# Patient Record
Sex: MALE | Race: WHITE | Employment: FULL TIME | URBAN - METROPOLITAN AREA
[De-identification: names, ages, dates, MRNs, and addresses within clinical notes are randomized per-mention and may not be internally consistent; named-entity substitution may affect disease eponyms.]

---

## 2017-01-03 DIAGNOSIS — I10 ESSENTIAL HYPERTENSION: Primary | ICD-10-CM

## 2017-01-03 RX ORDER — LISINOPRIL 20 MG/1
20 TABLET ORAL 2 TIMES DAILY
Qty: 180 TABLET | Refills: 3 | Status: SHIPPED | OUTPATIENT
Start: 2017-01-03 | End: 2017-11-13

## 2017-07-24 DIAGNOSIS — I48.91 ATRIAL FIBRILLATION (H): ICD-10-CM

## 2017-08-25 DIAGNOSIS — I10 BENIGN ESSENTIAL HYPERTENSION: ICD-10-CM

## 2017-08-25 RX ORDER — METOPROLOL SUCCINATE 25 MG/1
25 TABLET, EXTENDED RELEASE ORAL DAILY
Qty: 90 TABLET | Refills: 0 | Status: SHIPPED | OUTPATIENT
Start: 2017-08-25 | End: 2017-11-13

## 2017-09-01 ENCOUNTER — OFFICE VISIT (OUTPATIENT)
Dept: INTERNAL MEDICINE | Facility: CLINIC | Age: 67
End: 2017-09-01
Payer: COMMERCIAL

## 2017-09-01 VITALS
HEART RATE: 93 BPM | TEMPERATURE: 98.3 F | BODY MASS INDEX: 39.53 KG/M2 | SYSTOLIC BLOOD PRESSURE: 160 MMHG | OXYGEN SATURATION: 98 % | DIASTOLIC BLOOD PRESSURE: 82 MMHG | WEIGHT: 267.7 LBS

## 2017-09-01 DIAGNOSIS — H61.23 BILATERAL IMPACTED CERUMEN: ICD-10-CM

## 2017-09-01 DIAGNOSIS — H91.93 BILATERAL HEARING LOSS, UNSPECIFIED HEARING LOSS TYPE: ICD-10-CM

## 2017-09-01 DIAGNOSIS — H93.13 TINNITUS, BILATERAL: Primary | ICD-10-CM

## 2017-09-01 PROCEDURE — 99213 OFFICE O/P EST LOW 20 MIN: CPT | Performed by: FAMILY MEDICINE

## 2017-09-01 NOTE — MR AVS SNAPSHOT
After Visit Summary   9/1/2017    Tevin Hollis    MRN: [id number]           Patient Information     Date Of Birth          1950        Visit Information        Provider Department      9/1/2017 10:00 AM Lorenzo Almeida MD [facility]        Today's Diagnoses     Tinnitus, bilateral    -  1    Bilateral hearing loss, unspecified hearing loss type        Bilateral impacted cerumen           Follow-ups after your visit        Your next 10 appointments already scheduled     Nov 13, 2017  7:30 AM CST   LAB with RU LAB   [facility] ([facility])    [address] [place] [address] [address]  [facility] [id number] [phone]           Patient must bring picture ID. Patient should be prepared to give a urine specimen  Please do not eat 10-12 hours before your appointment if you are coming in fasting for labs on lipids, cholesterol, or glucose (sugar). Pregnant women should follow their Care Team instructions. Water with medications is okay. Do not drink coffee or other fluids. If you have concerns about taking  your medications, please ask at office or if scheduling via [employer], send a message by clicking on Secure Messaging, Message Your Care Team.            Nov 13, 2017  8:30 AM CST   Return Visit with Oleg Kingston MD   [facility] ([facility])    [id number] [place] [address] [address]  [facility] [id number] [phone]              Who to contact     If you have questions or need follow up information about today's clinic visit or your schedule please contact [facility] directly at [phone].  Normal or non-critical lab and imaging results will be communicated to you by MyChart, letter or phone within 4 business days after the clinic has received the results. If you do not hear from us within 7 days, please contact the clinic through MyChart or phone. If you have a  "critical or abnormal lab result, we will notify you by phone as soon as possible.  Submit refill requests through [employer] or call your pharmacy and they will forward the refill request to us. Please allow 3 business days for your refill to be completed.          Additional Information About Your Visit        [employer] Information     [employer] lets you send messages to your doctor, view your test results, renew your prescriptions, schedule appointments and more. To sign up, go to [email or web address] . Click on \"Log in\" on the left side of the screen, which will take you to the Welcome page. Then click on \"Sign up Now\" on the right side of the page.     You will be asked to enter the access code listed below, as well as some personal information. Please follow the directions to create your username and password.     Your access code is: [email or web address]  Expires: 2017  8:36 AM     Your access code will  in 90 days. If you need help or a new code, please call your [place] clinic or [phone].        Care EveryWhere ID     This is your Care EveryWhere ID. This could be used by other organizations to access your [place] medical records  [id number]        Your Vitals Were     Pulse Temperature Pulse Oximetry BMI (Body Mass Index)          93 98.3  F (36.8  C) (Oral) 98% 39.53 kg/m2         Blood Pressure from Last 3 Encounters:   17 160/82   16 122/78   16 158/86    Weight from Last 3 Encounters:   17 267 lb 11.2 oz (121.4 kg)   16 269 lb (122 kg)   16 266 lb 3.2 oz (120.7 kg)              Today, you had the following     No orders found for display       Primary Care Provider Office Phone # Fax #    Alka Thurston [phone]       [address]  [facility] [address]        Equal Access to Services     STEFANO ZHU AH: Soila Lyons, denilson crooks, sushil isaacsn ah. So [facility] " [phone].    ATENCIÓN: Si seble palafox, tiene a guevara disposición servicios gratuitos de asistencia lingüística. Belia blakely [phone].    We comply with applicable federal civil rights laws and [place] laws. We do not discriminate on the basis of race, color, national origin, age, disability sex, sexual orientation or gender identity.            Thank you!     Thank you for choosing [facility]  for your care. Our goal is always to provide you with excellent care. Hearing back from our patients is one way we can continue to improve our services. Please take a few minutes to complete the written survey that you may receive in the mail after your visit with us. Thank you!             Your Updated Medication List - Protect others around you: Learn how to safely use, store and throw away your medicines at www.disposemymeds.org.          This list is accurate as of: 9/1/17 11:59 PM.  Always use your most recent med list.                   Brand Name Dispense Instructions for use Diagnosis    lisinopril 20 MG tablet    PRINIVIL/ZESTRIL    180 tablet    Take 1 tablet (20 mg) by mouth 2 times daily    Essential hypertension       metoprolol 25 MG 24 hr tablet    TOPROL-XL    90 tablet    Take 1 tablet (25 mg) by mouth daily    Benign essential hypertension       MULTIVITAMIN PO      Take by mouth daily        order for DME     1 Device    Equipment being ordered: CPAP supplies    JESSIE (obstructive sleep apnea)       rivaroxaban ANTICOAGULANT 20 MG Tabs tablet    XARELTO    90 tablet    Take 1 tablet (20 mg) by mouth daily (with dinner)    Atrial fibrillation (H)

## 2017-09-01 NOTE — NURSING NOTE
"Chief Complaint   Patient presents with     Tinnitus     2 weeks Lt ear       Initial /82  Pulse 93  Temp 98.3  F (36.8  C) (Oral)  Wt 267 lb 11.2 oz (121.4 kg)  SpO2 98%  BMI 39.53 kg/m2 Estimated body mass index is 39.53 kg/(m^2) as calculated from the following:    Height as of 9/28/16: 5' 9\" (1.753 m).    Weight as of this encounter: 267 lb 11.2 oz (121.4 kg).  Medication Reconciliation: complete     Mayelin Hernandez CMA      "

## 2017-09-01 NOTE — PROGRESS NOTES
SUBJECTIVE:   Tevin Hollis is a 67 year old male who presents to clinic today for the following health issues:    Tinnitus: Lt ear x's 2 weeks    HISTORY    He notices ringing / buzzing in ears at least last 2 weeks. L > R. Has h/o cerumen impaction. Doesn't always hear conversations, especially in groups.    No ear pain or drainage.    He has h/o doing construction work.    He has JESSIE. uses CPAP.    Patient Active Problem List   Diagnosis     Advanced directives, counseling/discussion     Benign essential hypertension     Preventative health care     Chronic atrial fibrillation (HCC)     Sleep apnea     RBBB (right bundle branch block)     Obesity     Current Outpatient Prescriptions   Medication Sig Dispense Refill     metoprolol (TOPROL-XL) 25 MG 24 hr tablet Take 1 tablet (25 mg) by mouth daily 90 tablet 0     rivaroxaban ANTICOAGULANT (XARELTO) 20 MG TABS tablet Take 1 tablet (20 mg) by mouth daily (with dinner) 90 tablet 0     lisinopril (PRINIVIL/ZESTRIL) 20 MG tablet Take 1 tablet (20 mg) by mouth 2 times daily 180 tablet 3     Multiple Vitamins-Minerals (MULTIVITAMIN PO) Take by mouth daily       ORDER FOR DME Equipment being ordered: CPAP supplies 1 Device 0       REVIEW OF SYSTEMS    No sinus congestion  No HA  No cough      Past Medical History:   Diagnosis Date     Atrial fibrillation (H)     rate control     Benign essential hypertension      Obese      RBBB (right bundle branch block)      Sleep apnea     C PAP       EXAM  /82  Pulse 93  Temp 98.3  F (36.8  C) (Oral)  Wt 267 lb 11.2 oz (121.4 kg)  SpO2 98%  BMI 39.53 kg/m2    Ears:  bilat cerumen impaction was found. Irrigated with good result.  TM's w/o inflammation  Canals OK.  Phar: neg  Neck: no mass      (H93.13) Tinnitus, bilateral  (primary encounter diagnosis)  Comment:   Plan:     (H91.93) Bilateral hearing loss, unspecified hearing loss type  Comment:   Plan:     (H61.23) Bilateral impacted cerumen  Comment:   Plan:       He  will see how things go minus the wax. Suggested ENT opinion for continued bothering SX.

## 2017-09-21 DIAGNOSIS — I48.91 ATRIAL FIBRILLATION (H): ICD-10-CM

## 2017-10-10 ENCOUNTER — TELEPHONE (OUTPATIENT)
Dept: INTERNAL MEDICINE | Facility: CLINIC | Age: 67
End: 2017-10-10

## 2017-11-10 ENCOUNTER — PRE VISIT (OUTPATIENT)
Dept: CARDIOLOGY | Facility: CLINIC | Age: 67
End: 2017-11-10

## 2017-11-13 ENCOUNTER — OFFICE VISIT (OUTPATIENT)
Dept: CARDIOLOGY | Facility: CLINIC | Age: 67
End: 2017-11-13
Attending: INTERNAL MEDICINE
Payer: COMMERCIAL

## 2017-11-13 VITALS
HEIGHT: 69 IN | DIASTOLIC BLOOD PRESSURE: 84 MMHG | HEART RATE: 72 BPM | SYSTOLIC BLOOD PRESSURE: 160 MMHG | WEIGHT: 257.6 LBS | BODY MASS INDEX: 38.16 KG/M2

## 2017-11-13 DIAGNOSIS — I10 BENIGN ESSENTIAL HYPERTENSION: Primary | ICD-10-CM

## 2017-11-13 DIAGNOSIS — I10 BENIGN ESSENTIAL HYPERTENSION: ICD-10-CM

## 2017-11-13 DIAGNOSIS — I48.20 CHRONIC ATRIAL FIBRILLATION (H): ICD-10-CM

## 2017-11-13 DIAGNOSIS — G47.33 OBSTRUCTIVE SLEEP APNEA SYNDROME: ICD-10-CM

## 2017-11-13 DIAGNOSIS — I48.21 PERMANENT ATRIAL FIBRILLATION (H): ICD-10-CM

## 2017-11-13 DIAGNOSIS — E66.09 CLASS 2 OBESITY DUE TO EXCESS CALORIES WITHOUT SERIOUS COMORBIDITY WITH BODY MASS INDEX (BMI) OF 38.0 TO 38.9 IN ADULT: ICD-10-CM

## 2017-11-13 DIAGNOSIS — E66.812 CLASS 2 OBESITY DUE TO EXCESS CALORIES WITHOUT SERIOUS COMORBIDITY WITH BODY MASS INDEX (BMI) OF 38.0 TO 38.9 IN ADULT: ICD-10-CM

## 2017-11-13 LAB
ANION GAP SERPL CALCULATED.3IONS-SCNC: 6 MMOL/L (ref 3–14)
BUN SERPL-MCNC: 21 MG/DL (ref 7–30)
CALCIUM SERPL-MCNC: 8.7 MG/DL (ref 8.5–10.1)
CHLORIDE SERPL-SCNC: 107 MMOL/L (ref 94–109)
CO2 SERPL-SCNC: 26 MMOL/L (ref 20–32)
CREAT SERPL-MCNC: 0.88 MG/DL (ref 0.66–1.25)
GFR SERPL CREATININE-BSD FRML MDRD: 86 ML/MIN/1.7M2
GLUCOSE SERPL-MCNC: 101 MG/DL (ref 70–99)
POTASSIUM SERPL-SCNC: 3.6 MMOL/L (ref 3.4–5.3)
SODIUM SERPL-SCNC: 139 MMOL/L (ref 133–144)

## 2017-11-13 PROCEDURE — 80048 BASIC METABOLIC PNL TOTAL CA: CPT | Performed by: INTERNAL MEDICINE

## 2017-11-13 PROCEDURE — 99214 OFFICE O/P EST MOD 30 MIN: CPT | Performed by: INTERNAL MEDICINE

## 2017-11-13 PROCEDURE — 36415 COLL VENOUS BLD VENIPUNCTURE: CPT | Performed by: INTERNAL MEDICINE

## 2017-11-13 RX ORDER — CARVEDILOL 12.5 MG/1
12.5 TABLET ORAL 2 TIMES DAILY WITH MEALS
Qty: 60 TABLET | Refills: 11 | Status: SHIPPED | OUTPATIENT
Start: 2017-11-13 | End: 2017-12-18

## 2017-11-13 RX ORDER — LISINOPRIL 20 MG/1
20 TABLET ORAL 2 TIMES DAILY
Qty: 180 TABLET | Refills: 3 | Status: SHIPPED | OUTPATIENT
Start: 2017-11-13

## 2017-11-13 NOTE — PROGRESS NOTES
HISTORY OF PRESENT ILLNESS:  Tevin is a very nice 67-year-old gentleman with past medical history significant for obesity, hypertension, obstructive sleep apnea and chronic atrial fibrillation.      He is completely unaware of his atrial fibrillation.  It causes him no symptoms.  He has no symptoms to suggest a TIA or CVA.  He is having no bleeding problems with his Xarelto therapy.  His only complaint is the cost of the Xarelto.  His CHADS-VASc score is 2 for age and hypertension.      He has no family history of coronary artery disease.  He has no exertional chest, arm, neck, jaw or shoulder discomfort.  He states he shoveled a load of gravel last week without any symptoms whatsoever.  He states occasionally he will get some dyspnea on exertion.  He has no orthopnea or PND, no palpitations, lightheadedness, dizziness, syncope or near syncope.      Previous workup includes an echocardiogram describing borderline aortic root enlargement.  His Holter monitor from 2014 showed an average heart rate of 83, ranging from 39 to 153.      ASSESSMENT AND PLAN:  Tevin returns to clinic and appears to be doing well from a cardiac standpoint.  He has no clinical evidence of ischemia, heart failure or significant arrhythmia.      We talked about running a stress test, as we have never run one.  He is not interested and does not clinically appear to have any symptoms pointing towards coronary artery disease.      Blood pressure is still not well controlled at 160/84.  I will change his metoprolol to carvedilol 12.5 b.i.d.  I will have him follow up with my ROBERT in a month, and we will continue to follow his blood pressure.  Options at that point include increasing his carvedilol or adding a low-dose diuretic in the form of chlorthalidone 25 mg daily.      I did give him some samples of Xarelto today and told him he needs to stay on it to decrease his risk of cerebrovascular accident.      I again emphasized the importance of a  regular exercise regimen and weight loss.  He has lost about 10 pounds of weight from this summer.  I congratulated him on this and encouraged him to continue to do so.  However, body mass index still remains at 38.1.      Fasting lipid profile was excellent back in 2014.  I am not following this on a regular basis.      Electrolytes are normal with a sodium of 139, potassium of 3.6, BUN of 21 and creatinine 0.88.      I will plan on seeing him back in 1 year.         LOLI BARRIENTOS MD, [facility]             D: 2017 09:42   T: 2017 11:32   MT: ALONZO      Name:     MALIKA CALDERA   MRN:      [id number]        Account:      [id number]   :      1950           Service Date: 2017      Document: [id number]

## 2017-11-13 NOTE — MR AVS SNAPSHOT
After Visit Summary   11/13/2017    Tevin Hollis    MRN: [id number]           Patient Information     Date Of Birth          1950        Visit Information        Provider Department      11/13/2017 8:30 AM Oleg Kingston MD [facility]        Today's Diagnoses     Benign essential hypertension    -  1    Chronic atrial fibrillation (H)        Permanent atrial fibrillation (H)        Class 2 obesity due to excess calories without serious comorbidity with body mass index (BMI) of 38.0 to 38.9 in adult        Obstructive sleep apnea syndrome           Follow-ups after your visit        Additional Services     Follow-Up with Cardiac Advanced Practice Provider           Follow-Up with Cardiologist                 Future tests that were ordered for you today     Open Future Orders        Priority Expected Expires Ordered    Basic metabolic panel Routine 11/13/2018 11/14/2018 11/13/2017    Follow-Up with Cardiologist Routine 11/13/2018 11/14/2018 11/13/2017    Follow-Up with Cardiac Advanced Practice Provider Routine 12/13/2017 11/13/2018 11/13/2017            Who to contact     If you have questions or need follow up information about today's clinic visit or your schedule please contact [facility] directly at [phone].  Normal or non-critical lab and imaging results will be communicated to you by MyChart, letter or phone within 4 business days after the clinic has received the results. If you do not hear from us within 7 days, please contact the clinic through MyChart or phone. If you have a critical or abnormal lab result, we will notify you by phone as soon as possible.  Submit refill requests through [employer] or call your pharmacy and they will forward the refill request to us. Please allow 3 business days for your refill to be completed.          Additional Information About Your Visit        MyChart  "Information     [employer] lets you send messages to your doctor, view your test results, renew your prescriptions, schedule appointments and more. To sign up, go to [email or web address] . Click on \"Log in\" on the left side of the screen, which will take you to the Welcome page. Then click on \"Sign up Now\" on the right side of the page.     You will be asked to enter the access code listed below, as well as some personal information. Please follow the directions to create your username and password.     Your access code is: [email or web address]  Expires: 2017  7:36 AM     Your access code will  in 90 days. If you need help or a new code, please call your [place] clinic or [phone].        Care EveryWhere ID     This is your Care EveryWhere ID. This could be used by other organizations to access your [place] medical records  [id number]        Your Vitals Were     Pulse Height BMI (Body Mass Index)             72 1.753 m (5' 9\") 38.04 kg/m2          Blood Pressure from Last 3 Encounters:   17 160/84   17 160/82   16 122/78    Weight from Last 3 Encounters:   17 116.8 kg (257 lb 9.6 oz)   17 121.4 kg (267 lb 11.2 oz)   16 122 kg (269 lb)              We Performed the Following     Follow-Up with Cardiologist          Today's Medication Changes          These changes are accurate as of: 17  9:44 AM.  If you have any questions, ask your nurse or doctor.               Start taking these medicines.        Dose/Directions    carvedilol 12.5 MG tablet   Commonly known as:  COREG   Used for:  Chronic atrial fibrillation (H)   Started by:  Oleg Kingston MD        Dose:  12.5 mg   Take 1 tablet (12.5 mg) by mouth 2 times daily (with meals)   Quantity:  60 tablet   Refills:  11         Stop taking these medicines if you haven't already. Please contact your care team if you have questions.     metoprolol 25 MG 24 hr tablet   Commonly known as:  TOPROL-XL   Stopped by:  " Oleg Kingston MD                Where to get your medicines      These medications were sent to [facility] [address] [place]  [place], [place] - [address]  [address] [place] [address]     Phone:  [phone]     carvedilol 12.5 MG tablet    lisinopril 20 MG tablet         Some of these will need a paper prescription and others can be bought over the counter.  Ask your nurse if you have questions.     Bring a paper prescription for each of these medications     rivaroxaban ANTICOAGULANT 20 MG Tabs tablet                Primary Care Provider Office Phone # Fax #    Alka Arturo Thurston [phone]       [address] [facility] [address]        Equal Access to Services     STEFANO ZHU : Hadii johnathan aburto hadasho Soomaali, waaxda luqadaha, qaybta kaalmada adeegyada, sushil macdonald . So [facility] [phone].    ATENCIÓN: Si habla español, tiene a guevara disposición servicios gratuitos de asistencia lingüística. [facility] [phone].    We comply with applicable federal civil rights laws and [place] laws. We do not discriminate on the basis of race, color, national origin, age, disability, sex, sexual orientation, or gender identity.            Thank you!     Thank you for choosing [facility]  for your care. Our goal is always to provide you with excellent care. Hearing back from our patients is one way we can continue to improve our services. Please take a few minutes to complete the written survey that you may receive in the mail after your visit with us. Thank you!             Your Updated Medication List - Protect others around you: Learn how to safely use, store and throw away your medicines at www.disposemymeds.org.          This list is accurate as of: 11/13/17  9:44 AM.  Always use your most recent med list.                   Brand Name Dispense Instructions for use Diagnosis    carvedilol 12.5 MG tablet    COREG     60 tablet    Take 1 tablet (12.5 mg) by mouth 2 times daily (with meals)    Chronic atrial fibrillation (H)       lisinopril 20 MG tablet    PRINIVIL/ZESTRIL    180 tablet    Take 1 tablet (20 mg) by mouth 2 times daily        MULTIVITAMIN PO      Take by mouth daily        order for DME     1 Device    Equipment being ordered: CPAP supplies    JESSIE (obstructive sleep apnea)       rivaroxaban ANTICOAGULANT 20 MG Tabs tablet    XARELTO    90 tablet    Take 1 tablet (20 mg) by mouth daily (with dinner)    Permanent atrial fibrillation (H)

## 2017-11-13 NOTE — PROGRESS NOTES
HPI and Plan:   See dictation    Orders Placed This Encounter   Procedures     Basic metabolic panel     Follow-Up with Cardiologist     Follow-Up with Cardiac Advanced Practice Provider       Orders Placed This Encounter   Medications     carvedilol (COREG) 12.5 MG tablet     Sig: Take 1 tablet (12.5 mg) by mouth 2 times daily (with meals)     Dispense:  60 tablet     Refill:  11     lisinopril (PRINIVIL/ZESTRIL) 20 MG tablet     Sig: Take 1 tablet (20 mg) by mouth 2 times daily     Dispense:  180 tablet     Refill:  3     rivaroxaban ANTICOAGULANT (XARELTO) 20 MG TABS tablet     Sig: Take 1 tablet (20 mg) by mouth daily (with dinner)     Dispense:  90 tablet     Refill:  3       Medications Discontinued During This Encounter   Medication Reason     metoprolol (TOPROL-XL) 25 MG 24 hr tablet      lisinopril (PRINIVIL/ZESTRIL) 20 MG tablet Reorder     rivaroxaban ANTICOAGULANT (XARELTO) 20 MG TABS tablet Reorder         Encounter Diagnoses   Name Primary?     Chronic atrial fibrillation (H)      Permanent atrial fibrillation (H)      Benign essential hypertension Yes     Class 2 obesity due to excess calories without serious comorbidity with body mass index (BMI) of 38.0 to 38.9 in adult      Obstructive sleep apnea syndrome        CURRENT MEDICATIONS:  Current Outpatient Prescriptions   Medication Sig Dispense Refill     carvedilol (COREG) 12.5 MG tablet Take 1 tablet (12.5 mg) by mouth 2 times daily (with meals) 60 tablet 11     lisinopril (PRINIVIL/ZESTRIL) 20 MG tablet Take 1 tablet (20 mg) by mouth 2 times daily 180 tablet 3     rivaroxaban ANTICOAGULANT (XARELTO) 20 MG TABS tablet Take 1 tablet (20 mg) by mouth daily (with dinner) 90 tablet 3     Multiple Vitamins-Minerals (MULTIVITAMIN PO) Take by mouth daily       ORDER FOR DME Equipment being ordered: CPAP supplies 1 Device 0     [DISCONTINUED] rivaroxaban ANTICOAGULANT (XARELTO) 20 MG TABS tablet Take 1 tablet (20 mg) by mouth daily (with dinner) 90 tablet  "0     [DISCONTINUED] lisinopril (PRINIVIL/ZESTRIL) 20 MG tablet Take 1 tablet (20 mg) by mouth 2 times daily 180 tablet 3       ALLERGIES   No Known Allergies    PAST MEDICAL HISTORY:  Past Medical History:   Diagnosis Date     Atrial fibrillation (H)     rate control     Benign essential hypertension      Obese      RBBB (right bundle branch block)      Sleep apnea     C PAP       PAST SURGICAL HISTORY:  History reviewed. No pertinent surgical history.    FAMILY HISTORY:  Family History   Problem Relation Age of Onset     Respiratory Father      COPD       SOCIAL HISTORY:  Social History     Social History     Marital status:      Spouse name: N/A     Number of children: N/A     Years of education: N/A     Social History Main Topics     Smoking status: Former Smoker     Packs/day: 0.50     Years: 2.00     Types: Cigarettes     Quit date: 1/1/1971     Smokeless tobacco: Never Used     Alcohol use 1.8 oz/week     3 Standard drinks or equivalent per week      Comment: 1 beer per day     Drug use: No     Sexual activity: Yes     Partners: Female     Other Topics Concern     Caffeine Concern No     none     Sleep Concern Yes     sleep apnea, uses cpap     Stress Concern Yes     health concerns with wife     Weight Concern Yes     wants to loose     Special Diet No     Exercise Yes     walking daily     Social History Narrative       Review of Systems:  Skin:  Negative       Eyes:  Positive for glasses    ENT:  Positive for nasal congestion currently has URI  Respiratory:  Positive for sleep apnea;CPAP     Cardiovascular:  Negative      Gastroenterology: Negative      Genitourinary:  Positive for urinary frequency;nocturia    Musculoskeletal:  Negative      Neurologic:  Negative      Psychiatric:  Negative      Heme/Lymph/Imm:  Negative      Endocrine:  Negative        Physical Exam:  Vitals: /84 (BP Location: Right arm, Patient Position: Sitting, Cuff Size: Adult Regular)  Pulse 72  Ht 1.753 m (5' 9\")  " Wt 116.8 kg (257 lb 9.6 oz)  BMI 38.04 kg/m2    Constitutional:  cooperative, alert and oriented, well developed, well nourished, in no acute distress obese      Skin:  warm and dry to the touch, no apparent skin lesions or masses noted          Head:  normocephalic, no masses or lesions        Eyes:  pupils equal and round;conjunctivae and lids unremarkable;sclera white;no xanthalasma;no nystagmus        Lymph:      ENT:  no pallor or cyanosis, dentition good        Neck:  carotid pulses are full and equal bilaterally;no carotid bruit        Respiratory:  normal breath sounds, clear to auscultation, normal A-P diameter, normal symmetry, normal respiratory excursion, no use of accessory muscles         Cardiac:   irregularly irregular rhythm            rate controlled  pulses full and equal                                        GI:    obese      Extremities and Muscular Skeletal:  no spinal abnormalities noted;normal muscle strength and tone   bilateral LE edema;trace          Neurological:  affect appropriate;no gross motor deficits        Psych:  affect appropriate, oriented to time, person and place        CC  Oleg Kingston MD  [address]  [place] [address]

## 2017-11-13 NOTE — LETTER
11/13/2017    Alka Thurston MD  [address] [facility] [address]    RE: Tevin LOCKE Bunny       Dear Colleague,    I had the pleasure of seeing Tevin Hollis in the [facility].    Tevin is a very nice 67-year-old gentleman with past medical history significant for obesity, hypertension, obstructive sleep apnea and chronic atrial fibrillation.      He is completely unaware of his atrial fibrillation.  It causes him no symptoms.  He has no symptoms to suggest a TIA or CVA.  He is having no bleeding problems with his Xarelto therapy.  His only complaint is the cost of the Xarelto.  His CHADS-VASc score is 2 for age and hypertension.      He has no family history of coronary artery disease.  He has no exertional chest, arm, neck, jaw or shoulder discomfort.  He states he shoveled a load of gravel last week without any symptoms whatsoever.  He states occasionally he will get some dyspnea on exertion.  He has no orthopnea or PND, no palpitations, lightheadedness, dizziness, syncope or near syncope.      Previous workup includes an echocardiogram describing borderline aortic root enlargement.  His Holter monitor from 2014 showed an average heart rate of 83, ranging from 39 to 153.     Outpatient Encounter Prescriptions as of 11/13/2017   Medication Sig Dispense Refill     carvedilol (COREG) 12.5 MG tablet Take 1 tablet (12.5 mg) by mouth 2 times daily (with meals) 60 tablet 11     lisinopril (PRINIVIL/ZESTRIL) 20 MG tablet Take 1 tablet (20 mg) by mouth 2 times daily 180 tablet 3     rivaroxaban ANTICOAGULANT (XARELTO) 20 MG TABS tablet Take 1 tablet (20 mg) by mouth daily (with dinner) 90 tablet 3     Multiple Vitamins-Minerals (MULTIVITAMIN PO) Take by mouth daily       ORDER FOR DME Equipment being ordered: CPAP supplies 1 Device 0     [DISCONTINUED] rivaroxaban ANTICOAGULANT (XARELTO) 20 MG TABS tablet Take 1 tablet (20 mg) by mouth daily (with dinner) 90 tablet 0      [DISCONTINUED] metoprolol (TOPROL-XL) 25 MG 24 hr tablet Take 1 tablet (25 mg) by mouth daily 90 tablet 0     [DISCONTINUED] lisinopril (PRINIVIL/ZESTRIL) 20 MG tablet Take 1 tablet (20 mg) by mouth 2 times daily 180 tablet 3     No facility-administered encounter medications on file as of 11/13/2017.       ASSESSMENT AND PLAN:  Tevin returns to clinic and appears to be doing well from a cardiac standpoint.  He has no clinical evidence of ischemia, heart failure or significant arrhythmia.      We talked about running a stress test, as we have never run one.  He is not interested and does not clinically appear to have any symptoms pointing towards coronary artery disease.      Blood pressure is still not well controlled at 160/84.  I will change his metoprolol to carvedilol 12.5 b.i.d.  I will have him follow up with my ROBERT in a month, and we will continue to follow his blood pressure.  Options at that point include increasing his carvedilol or adding a low-dose diuretic in the form of chlorthalidone 25 mg daily.      I did give him some samples of Xarelto today and told him he needs to stay on it to decrease his risk of cerebrovascular accident.      I again emphasized the importance of a regular exercise regimen and weight loss.  He has lost about 10 pounds of weight from this summer.  I congratulated him on this and encouraged him to continue to do so.  However, body mass index still remains at 38.1.      Fasting lipid profile was excellent back in 11/2014.  I am not following this on a regular basis.      Electrolytes are normal with a sodium of 139, potassium of 3.6, BUN of 21 and creatinine 0.88.      I will plan on seeing him back in 1 year.     Again, thank you for allowing me to participate in the care of your patient.      Sincerely,    Oleg Kingston MD     [facility]

## 2017-12-18 ENCOUNTER — OFFICE VISIT (OUTPATIENT)
Dept: CARDIOLOGY | Facility: CLINIC | Age: 67
End: 2017-12-18
Attending: INTERNAL MEDICINE
Payer: COMMERCIAL

## 2017-12-18 VITALS
BODY MASS INDEX: 40.52 KG/M2 | SYSTOLIC BLOOD PRESSURE: 158 MMHG | HEART RATE: 76 BPM | WEIGHT: 273.6 LBS | DIASTOLIC BLOOD PRESSURE: 90 MMHG | HEIGHT: 69 IN

## 2017-12-18 DIAGNOSIS — I48.20 CHRONIC ATRIAL FIBRILLATION (H): ICD-10-CM

## 2017-12-18 DIAGNOSIS — I10 BENIGN ESSENTIAL HYPERTENSION: Primary | ICD-10-CM

## 2017-12-18 PROCEDURE — 99214 OFFICE O/P EST MOD 30 MIN: CPT | Performed by: PHYSICIAN ASSISTANT

## 2017-12-18 RX ORDER — CARVEDILOL 25 MG/1
25 TABLET ORAL 2 TIMES DAILY WITH MEALS
Qty: 180 TABLET | Refills: 3 | Status: SHIPPED | OUTPATIENT
Start: 2017-12-18

## 2017-12-18 NOTE — PROGRESS NOTES
Please see separate dictation for HPI, PHYSICAL EXAM AND IMPRESSION/PLAN.    CURRENT MEDICATIONS:  Current Outpatient Prescriptions   Medication Sig Dispense Refill     carvedilol (COREG) 12.5 MG tablet Take 1 tablet (12.5 mg) by mouth 2 times daily (with meals) 60 tablet 11     lisinopril (PRINIVIL/ZESTRIL) 20 MG tablet Take 1 tablet (20 mg) by mouth 2 times daily 180 tablet 3     rivaroxaban ANTICOAGULANT (XARELTO) 20 MG TABS tablet Take 1 tablet (20 mg) by mouth daily (with dinner) 90 tablet 3     Multiple Vitamins-Minerals (MULTIVITAMIN PO) Take by mouth daily       ORDER FOR DME Equipment being ordered: CPAP supplies 1 Device 0       ALLERGIES:   No Known Allergies    PAST MEDICAL HISTORY:  Past Medical History:   Diagnosis Date     Benign essential hypertension      Class 2 obesity due to excess calories without serious comorbidity with body mass index (BMI) of 38.0 to 38.9 in adult 12/5/2014     Obstructive sleep apnea syndrome      Permanent atrial fibrillation (H) 11/14/2014    rate control     RBBB (right bundle branch block)        PAST SURGICAL HISTORY:  No past surgical history on file.    SOCIAL HISTORY:  Social History     Social History     Marital status:      Spouse name: N/A     Number of children: N/A     Years of education: N/A     Social History Main Topics     Smoking status: Former Smoker     Packs/day: 0.50     Years: 2.00     Types: Cigarettes     Quit date: 1/1/1971     Smokeless tobacco: Never Used     Alcohol use 1.8 oz/week     3 Standard drinks or equivalent per week      Comment: 1 beer per day     Drug use: No     Sexual activity: Yes     Partners: Female     Other Topics Concern     Caffeine Concern No     none     Sleep Concern Yes     sleep apnea, uses cpap     Stress Concern Yes     health concerns with wife     Weight Concern Yes     wants to loose     Special Diet No     Exercise Yes     walking daily     Social History Narrative       FAMILY HISTORY:  Family History    Problem Relation Age of Onset     Respiratory Father      COPD     Hypertension Mother        Review of Systems:  Skin:  Positive for   dry skin during the winter time   Eyes:  Positive for   reading glasses  ENT:  Positive for nasal congestion    Respiratory:  Positive for sleep apnea;CPAP     Cardiovascular:  Negative      Gastroenterology: Negative      Genitourinary:  Positive for urinary frequency;nocturia 1-2x per night  Musculoskeletal:  Negative      Neurologic:  Negative      Psychiatric:  Positive for excessive stress    Heme/Lymph/Imm:  Negative      Endocrine:  Negative         Reviewed. Remainder of the note dictated.    Katina Martinez PA-C

## 2017-12-18 NOTE — LETTER
12/18/2017      Alka Thurston MD  [address] [facility] [address]      RE: Tevin Steinerel       Dear Colleague,    I had the pleasure of seeing Tevin Hollis in the [facility].    HISTORY OF PRESENT ILLNESS:  Mr. Hollis is a very pleasant 67-year-old gentleman who presents to Cardiology office today for followup on his hypertension.      His cardiovascular history includes chronic atrial fibrillation, hypertension, obstructive sleep apnea and obesity.  He was recently in for routine followup with Dr. Kingston and overall was doing well from a cardiac standpoint aside from asymptomatic hypertension.  At that point, his metoprolol was discontinued and he was started on carvedilol 12.5 mg b.i.d.  He was asked to follow up in the office today.      Again, he is doing well from a cardiac standpoint and has no cardiovascular limitations or symptoms.      CURRENT CARDIAC MEDICATIONS:   1.  Carvedilol 12.5 mg b.i.d.   2.  Lisinopril 20 mg b.i.d.   3.  Xarelto 20 mg daily.      The remainder of his medications, allergies and review of systems were reviewed and as are documented separately.      PHYSICAL EXAMINATION:   GENERAL:  The patient is a 67-year-old gentleman who appears his stated age.  He is in no apparent distress.   VITAL SIGNS:  His blood pressure was 158/90, on recheck is 160/92.  Weight is 173 pounds (I am not sure if this is accurate).     LUNGS:  His breathing is nonlabored.   CARDIAC:  Reveals an irregular rate and rhythm.      ASSESSMENT AND PLAN:  The patient is a pleasant 67-year-old gentleman with chronic atrial fibrillation with a well-controlled rate.  He continues Xarelto for anticoagulation.  He also has hypertension and unfortunately has been having uncontrolled blood pressure recently.  I suggested we increase his carvedilol to 25 mg b.i.d. for the time being.  He will continue his lisinopril as well.  I asked him to follow up in a few weeks.  If his  blood pressure is still elevated at that time, I would consider adding a diuretic such as chlorthalidone.  In the interim, the patient is going to try to have his blood pressure checked at his local clinic a few times so we can get a more accurate idea of the overall trend.      Again, I will see the patient back in about 3 weeks for another blood pressure check.  Of course, I encouraged him to contact us sooner with questions or concerns.       Outpatient Encounter Prescriptions as of 12/18/2017   Medication Sig Dispense Refill     carvedilol (COREG) 25 MG tablet Take 1 tablet (25 mg) by mouth 2 times daily (with meals) 180 tablet 3     lisinopril (PRINIVIL/ZESTRIL) 20 MG tablet Take 1 tablet (20 mg) by mouth 2 times daily 180 tablet 3     rivaroxaban ANTICOAGULANT (XARELTO) 20 MG TABS tablet Take 1 tablet (20 mg) by mouth daily (with dinner) 90 tablet 3     Multiple Vitamins-Minerals (MULTIVITAMIN PO) Take by mouth daily       ORDER FOR DME Equipment being ordered: CPAP supplies 1 Device 0     [DISCONTINUED] carvedilol (COREG) 12.5 MG tablet Take 1 tablet (12.5 mg) by mouth 2 times daily (with meals) 60 tablet 11     No facility-administered encounter medications on file as of 12/18/2017.        Again, thank you for allowing me to participate in the care of your patient.      Sincerely,    Katina Martinez PA-C     [facility]

## 2017-12-18 NOTE — MR AVS SNAPSHOT
After Visit Summary   12/18/2017    Tevin Hollis    MRN: [id number]           Patient Information     Date Of Birth          1950        Visit Information        Provider Department      12/18/2017 1:00 PM Katina Martinez PA-C [facility]        Today's Diagnoses     Benign essential hypertension    -  1    Chronic atrial fibrillation (H)          Care Instructions    Thank you for your [facility] visit today. Your provider has recommended the following:    Medication Changes:  INCREASE to coreg to 25mg twice a day. Ok to take two 12.5mg tabs twice a day for now. Your new bottle will be 25mg tabs.  Recommendations:  Nothing new right now. Check BP occassionally  Follow-up:  See Katina BRIGHT for cardiology follow up in 2-3 weeks.    Reminder:  Please bring in all current medications, over the counter supplements and vitamin bottles to your next appointment.  [facility]              Follow-ups after your visit        Additional Services     Follow-Up with Cardiac Advanced Practice Provider                 Your next 10 appointments already scheduled     Jan 08, 2018 12:30 PM CST   Return Visit with Katina Martinez PA-C   [facility] ([facility])    [id number] [place] [id number] [phone]              Future tests that were ordered for you today     Open Future Orders        Priority Expected Expires Ordered    Follow-Up with Cardiac Advanced Practice Provider Routine 1/8/2018 12/18/2019 12/18/2017            Who to contact     If you have questions or need follow up information about today's clinic visit or your schedule please contact [facility] directly at [phone].  Normal or non-critical lab and imaging results will be communicated to you by MyChart, letter or phone within 4 business  "days after the clinic has received the results. If you do not hear from us within 7 days, please contact the clinic through Monetsu or phone. If you have a critical or abnormal lab result, we will notify you by phone as soon as possible.  Submit refill requests through Monetsu or call your pharmacy and they will forward the refill request to us. Please allow 3 business days for your refill to be completed.          Additional Information About Your Visit        Monetsu Information     Monetsu lets you send messages to your doctor, view your test results, renew your prescriptions, schedule appointments and more. To sign up, go to [email or web address] . Click on \"Log in\" on the left side of the screen, which will take you to the Welcome page. Then click on \"Sign up Now\" on the right side of the page.     You will be asked to enter the access code listed below, as well as some personal information. Please follow the directions to create your username and password.     Your access code is: [email or web address]  Expires: 3/18/2018  1:37 PM     Your access code will  in 90 days. If you need help or a new code, please call your [place] clinic or [phone].        Care EveryWhere ID     This is your Care EveryWhere ID. This could be used by other organizations to access your [place] medical records  [id number]        Your Vitals Were     Pulse Height BMI (Body Mass Index)             76 1.753 m (5' 9\") 40.4 kg/m2          Blood Pressure from Last 3 Encounters:   17 158/90   17 160/84   17 160/82    Weight from Last 3 Encounters:   17 124.1 kg (273 lb 9.6 oz)   17 116.8 kg (257 lb 9.6 oz)   17 121.4 kg (267 lb 11.2 oz)              We Performed the Following     Follow-Up with Cardiac Advanced Practice Provider          Today's Medication Changes          These changes are accurate as of: 17  1:37 PM.  If you have any questions, ask your nurse or doctor.               These " medicines have changed or have updated prescriptions.        Dose/Directions    carvedilol 25 MG tablet   Commonly known as:  COREG   This may have changed:    - medication strength  - how much to take   Used for:  Chronic atrial fibrillation (H)   Changed by:  Katina Martinez PA-C        Dose:  25 mg   Take 1 tablet (25 mg) by mouth 2 times daily (with meals)   Quantity:  180 tablet   Refills:  3            Where to get your medicines      These medications were sent to [facility] [address] [place] [address]  [address], [facility] [address]     Phone:  [phone]     carvedilol 25 MG tablet                Primary Care Provider Office Phone # Fax #    Alka Thurston [phone]       [address]  [facility] [address]        Equal Access to Services     [facility] : Hadii aad ku hadasho Soomaali, waaxda luqadaha, qaybta kaalmada adeegyada, sushil sarabia haykatrina macdonald . So [facility] [phone].    ATENCIÓN: Si habla español, tiene a guevara disposición servicios gratuitos de asistencia lingüística. Belia al [phone].    We comply with applicable federal civil rights laws and [place] laws. We do not discriminate on the basis of race, color, national origin, age, disability, sex, sexual orientation, or gender identity.            Thank you!     Thank you for choosing [facility]  for your care. Our goal is always to provide you with excellent care. Hearing back from our patients is one way we can continue to improve our services. Please take a few minutes to complete the written survey that you may receive in the mail after your visit with us. Thank you!             Your Updated Medication List - Protect others around you: Learn how to safely use, store and throw away your medicines at www.disposemymeds.org.          This list is accurate as of: 12/18/17  1:37 PM.  Always use your most recent med  list.                   Brand Name Dispense Instructions for use Diagnosis    carvedilol 25 MG tablet    COREG    180 tablet    Take 1 tablet (25 mg) by mouth 2 times daily (with meals)    Chronic atrial fibrillation (H)       lisinopril 20 MG tablet    PRINIVIL/ZESTRIL    180 tablet    Take 1 tablet (20 mg) by mouth 2 times daily        MULTIVITAMIN PO      Take by mouth daily        order for DME     1 Device    Equipment being ordered: CPAP supplies    JESSIE (obstructive sleep apnea)       rivaroxaban ANTICOAGULANT 20 MG Tabs tablet    XARELTO    90 tablet    Take 1 tablet (20 mg) by mouth daily (with dinner)    Permanent atrial fibrillation (H)

## 2017-12-18 NOTE — PATIENT INSTRUCTIONS
Thank you for your [facility] visit today. Your provider has recommended the following:    Medication Changes:  INCREASE to coreg to 25mg twice a day. Ok to take two 12.5mg tabs twice a day for now. Your new bottle will be 25mg tabs.  Recommendations:  Nothing new right now. Check BP occassionally  Follow-up:  See Katina BRIGHT for cardiology follow up in 2-3 weeks.    Reminder:  Please bring in all current medications, over the counter supplements and vitamin bottles to your next appointment.  [facility]

## 2017-12-18 NOTE — PROGRESS NOTES
HISTORY OF PRESENT ILLNESS:  Mr. Hollis is a very pleasant 67-year-old gentleman who presents to Cardiology office today for followup on his hypertension.      His cardiovascular history includes chronic atrial fibrillation, hypertension, obstructive sleep apnea and obesity.  He was recently in for routine followup with Dr. Kingston and overall was doing well from a cardiac standpoint aside from asymptomatic hypertension.  At that point, his metoprolol was discontinued and he was started on carvedilol 12.5 mg b.i.d.  He was asked to follow up in the office today.      Again, he is doing well from a cardiac standpoint and has no cardiovascular limitations or symptoms.      CURRENT CARDIAC MEDICATIONS:   1.  Carvedilol 12.5 mg b.i.d.   2.  Lisinopril 20 mg b.i.d.   3.  Xarelto 20 mg daily.      The remainder of his medications, allergies and review of systems were reviewed and as are documented separately.      PHYSICAL EXAMINATION:   GENERAL:  The patient is a 67-year-old gentleman who appears his stated age.  He is in no apparent distress.   VITAL SIGNS:  His blood pressure was 158/90, on recheck is 160/92.  Weight is 173 pounds (I am not sure if this is accurate).     LUNGS:  His breathing is nonlabored.   CARDIAC:  Reveals an irregular rate and rhythm.      ASSESSMENT AND PLAN:  The patient is a pleasant 67-year-old gentleman with chronic atrial fibrillation with a well-controlled rate.  He continues Xarelto for anticoagulation.  He also has hypertension and unfortunately has been having uncontrolled blood pressure recently.  I suggested we increase his carvedilol to 25 mg b.i.d. for the time being.  He will continue his lisinopril as well.  I asked him to follow up in a few weeks.  If his blood pressure is still elevated at that time, I would consider adding a diuretic such as chlorthalidone.  In the interim, the patient is going to try to have his blood pressure checked at his local clinic a few times so we can  get a more accurate idea of the overall trend.      Again, I will see the patient back in about 3 weeks for another blood pressure check.  Of course, I encouraged him to contact us sooner with questions or concerns.         JESSICA HANSEN PA-C             D: 2017 13:42   T: 2017 14:48   MT: NAMITA      Name:     MALIKA CALDERA   MRN:      [id number]        Account:      [id number]   :      1950           Service Date: 2017      Document: [id number]

## 2018-01-08 ENCOUNTER — OFFICE VISIT (OUTPATIENT)
Dept: CARDIOLOGY | Facility: CLINIC | Age: 68
End: 2018-01-08
Attending: PHYSICIAN ASSISTANT
Payer: COMMERCIAL

## 2018-01-08 VITALS
SYSTOLIC BLOOD PRESSURE: 158 MMHG | HEIGHT: 69 IN | BODY MASS INDEX: 40.32 KG/M2 | HEART RATE: 72 BPM | WEIGHT: 272.2 LBS | DIASTOLIC BLOOD PRESSURE: 104 MMHG

## 2018-01-08 DIAGNOSIS — I10 BENIGN ESSENTIAL HYPERTENSION: Primary | ICD-10-CM

## 2018-01-08 DIAGNOSIS — I48.21 PERMANENT ATRIAL FIBRILLATION (H): ICD-10-CM

## 2018-01-08 PROCEDURE — 99214 OFFICE O/P EST MOD 30 MIN: CPT | Performed by: PHYSICIAN ASSISTANT

## 2018-01-08 RX ORDER — CHLORTHALIDONE 25 MG/1
25 TABLET ORAL DAILY
Qty: 30 TABLET | Refills: 3 | Status: SHIPPED | OUTPATIENT
Start: 2018-01-08 | End: 2018-02-01

## 2018-01-08 NOTE — LETTER
1/8/2018      Alka Thurston MD  [address] [facility] [address]      RE: Tevin Steinerel       Dear Colleague,    I had the pleasure of seeing Tevin Hollis in the [facility].    HISTORY OF PRESENT ILLNESS:  Mr. Hollis is a pleasant 67-year-old gentleman who presents to Cardiology office today for followup on his hypertension.      His cardiovascular history includes chronic atrial fibrillation for which he remains on Xarelto for anticoagulation, hypertension, obstructive sleep apnea for which he religiously wears his CPAP and obesity.  Again, he was in to the office in 11/2017 for his routine followup with Dr. Kingston and was noted to be hypertensive at that time.  His metoprolol was discontinued, and he was started on carvedilol 12.5 mg b.i.d.  I saw him in followup in mid December.  At that time, he remained hypertensive and I asked him to increase the carvedilol to 25 mg b.i.d.  He is following up in the office today.      Unfortunately, he has not had time to recheck his blood pressure since our last visit.  He is doing well from a cardiac standpoint, denies any cardiovascular symptoms or limitations.  He denies any side effects.      CURRENT CARDIAC MEDICATIONS:   1.  Carvedilol 25 mg b.i.d.   2.  Lisinopril 20 mg b.i.d.   3.  Xarelto 20 mg daily.      The remainder of his medications, allergies and review of systems were reviewed and as are documented separately.      PHYSICAL EXAMINATION:   GENERAL:  The patient is a pleasant 67-year-old gentleman who appears his stated age.  He is in no apparent distress.   VITAL SIGNS:  Blood pressure was 158/104.  On recheck it was again 158/104.  His weight is 272 pounds.   LUNGS:  Breathing is nonlabored.   CARDIAC:  Reveals an irregular rate and rhythm, no murmurs appreciated.      ASSESSMENT AND PLAN:  The patient is a pleasant 67-year-old gentleman with chronic atrial fibrillation and well-controlled rate, remains on  Xarelto for anticoagulation, who has been followed recently due to uncontrolled hypertension.  Unfortunately, his blood pressure did not change much despite the change from metoprolol to carvedilol and up-titration to the medication.  At this point, I have recommended that we add chlorthalidone and patient is agreeable.  I will plan to see him back in the clinic in about 2-3 weeks with a basic metabolic panel prior to that office visit.  I did encourage him to try to have his blood pressure checked a couple of times in the interim.      Thank you for allowing us to participate in the care of this pleasant patient.       Outpatient Encounter Prescriptions as of 1/8/2018   Medication Sig Dispense Refill     chlorthalidone (HYGROTON) 25 MG tablet Take 1 tablet (25 mg) by mouth daily 30 tablet 3     carvedilol (COREG) 25 MG tablet Take 1 tablet (25 mg) by mouth 2 times daily (with meals) 180 tablet 3     lisinopril (PRINIVIL/ZESTRIL) 20 MG tablet Take 1 tablet (20 mg) by mouth 2 times daily 180 tablet 3     rivaroxaban ANTICOAGULANT (XARELTO) 20 MG TABS tablet Take 1 tablet (20 mg) by mouth daily (with dinner) 90 tablet 3     Multiple Vitamins-Minerals (MULTIVITAMIN PO) Take by mouth daily       ORDER FOR DME Equipment being ordered: CPAP supplies 1 Device 0     No facility-administered encounter medications on file as of 1/8/2018.        Again, thank you for allowing me to participate in the care of your patient.      Sincerely,    Katina Martinez PA-C     [facility]

## 2018-01-08 NOTE — PATIENT INSTRUCTIONS
Thank you for your [facility] visit today. Your provider has recommended the following:    Medication Changes:  Start chlorthalidone  Recommendations:  Lab work in a couple of weeks  Follow-up:  See Katina BRIGHT for cardiology follow up in 2-3 weeks.    Reminder:  Please bring in all current medications, over the counter supplements and vitamin bottles to your next appointment.            [facility]

## 2018-01-08 NOTE — MR AVS SNAPSHOT
After Visit Summary   1/8/2018    Tevin Hollis    MRN: [id number]           Patient Information     Date Of Birth          1950        Visit Information        Provider Department      1/8/2018 12:30 PM Katina Martinez PA-C [facility]        Today's Diagnoses     Benign essential hypertension          Care Instructions    Thank you for your [facility] visit today. Your provider has recommended the following:    Medication Changes:  Start chlorthalidone  Recommendations:  Lab work in a couple of weeks  Follow-up:  See Katina BRIGHT for cardiology follow up in 2-3 weeks.    Reminder:  Please bring in all current medications, over the counter supplements and vitamin bottles to your next appointment.            [facility]              Follow-ups after your visit        Additional Services     Follow-Up with Cardiac Advanced Practice Provider                 Your next 10 appointments already scheduled     Feb 02, 2018 12:00 PM CST   LAB with RU LAB   [facility] AT [place] ([facility])    [address] [place] [id number] [phone]           Please do not eat 10-12 hours before your appointment if you are coming in fasting for labs on lipids, cholesterol, or glucose (sugar). This does not apply to pregnant women. Water, hot tea and black coffee (with nothing added) are okay. Do not drink other fluids, diet soda or chew gum.            Feb 02, 2018  1:00 PM CST   Return Visit with Katina Martinez PA-C   [facility] ([facility])    [address] [place] [address] [address]  [facility] [id number] [phone]              Future tests that were ordered for you today     Open Future Orders        Priority Expected Expires Ordered    Follow-Up with Cardiac Advanced Practice Provider Routine 1/22/2018 1/8/2020  "2018    Basic metabolic panel Routine 2018            Who to contact     If you have questions or need follow up information about today's clinic visit or your schedule please contact [facility] directly at [phone].  Normal or non-critical lab and imaging results will be communicated to you by MyChart, letter or phone within 4 business days after the clinic has received the results. If you do not hear from us within 7 days, please contact the clinic through MyChart or phone. If you have a critical or abnormal lab result, we will notify you by phone as soon as possible.  Submit refill requests through [employer] or call your pharmacy and they will forward the refill request to us. Please allow 3 business days for your refill to be completed.          Additional Information About Your Visit        MyChart Information     [employer] lets you send messages to your doctor, view your test results, renew your prescriptions, schedule appointments and more. To sign up, go to [email or web address] . Click on \"Log in\" on the left side of the screen, which will take you to the Welcome page. Then click on \"Sign up Now\" on the right side of the page.     You will be asked to enter the access code listed below, as well as some personal information. Please follow the directions to create your username and password.     Your access code is: [email or web address]  Expires: 3/18/2018  1:37 PM     Your access code will  in 90 days. If you need help or a new code, please call your [place] clinic or [phone].        Care EveryWhere ID     This is your Care EveryWhere ID. This could be used by other organizations to access your [place] medical records  [id number]        Your Vitals Were     Pulse Height BMI (Body Mass Index)             72 1.753 m (5' 9\") 40.2 kg/m2          Blood Pressure from Last 3 Encounters:   18 (!) 158/104   17 158/90 "   11/13/17 160/84    Weight from Last 3 Encounters:   01/08/18 123.5 kg (272 lb 3.2 oz)   12/18/17 124.1 kg (273 lb 9.6 oz)   11/13/17 116.8 kg (257 lb 9.6 oz)              We Performed the Following     Follow-Up with Cardiac Advanced Practice Provider          Today's Medication Changes          These changes are accurate as of: 1/8/18 12:49 PM.  If you have any questions, ask your nurse or doctor.               Start taking these medicines.        Dose/Directions    chlorthalidone 25 MG tablet   Commonly known as:  HYGROTON   Used for:  Benign essential hypertension   Started by:  Katina Martinez PA-C        Dose:  25 mg   Take 1 tablet (25 mg) by mouth daily   Quantity:  30 tablet   Refills:  3            Where to get your medicines      These medications were sent to [facility] [place] [address]  [address] [facility] [address]     Phone:  [phone]     chlorthalidone 25 MG tablet                Primary Care Provider Office Phone # Fax #    Alka Thurston [phone]       [address] [facility] [address]        Equal Access to Services     STEFANO ZHU AH: Hadii johnathan aburto hadasho Soomaali, waaxda luqadaha, qaybta kaalmada adeegyada, sushil rivera. So [facility] [phone].    ATENCIÓN: Si habla español, tiene a guevara disposición servicios gratuitos de asistencia lingüística. Llame al [phone].    We comply with applicable federal civil rights laws and [place] laws. We do not discriminate on the basis of race, color, national origin, age, disability, sex, sexual orientation, or gender identity.            Thank you!     Thank you for choosing [facility]  for your care. Our goal is always to provide you with excellent care. Hearing back from our patients is one way we can continue to improve our services. Please take a few minutes to complete the written survey that you  may receive in the mail after your visit with us. Thank you!             Your Updated Medication List - Protect others around you: Learn how to safely use, store and throw away your medicines at www.disposemymeds.org.          This list is accurate as of: 1/8/18 12:49 PM.  Always use your most recent med list.                   Brand Name Dispense Instructions for use Diagnosis    carvedilol 25 MG tablet    COREG    180 tablet    Take 1 tablet (25 mg) by mouth 2 times daily (with meals)    Chronic atrial fibrillation (H)       chlorthalidone 25 MG tablet    HYGROTON    30 tablet    Take 1 tablet (25 mg) by mouth daily    Benign essential hypertension       lisinopril 20 MG tablet    PRINIVIL/ZESTRIL    180 tablet    Take 1 tablet (20 mg) by mouth 2 times daily        MULTIVITAMIN PO      Take by mouth daily        order for DME     1 Device    Equipment being ordered: CPAP supplies    JESSIE (obstructive sleep apnea)       rivaroxaban ANTICOAGULANT 20 MG Tabs tablet    XARELTO    90 tablet    Take 1 tablet (20 mg) by mouth daily (with dinner)    Permanent atrial fibrillation (H)

## 2018-01-08 NOTE — PROGRESS NOTES
HISTORY OF PRESENT ILLNESS:  Mr. Hollis is a pleasant 67-year-old gentleman who presents to Cardiology office today for followup on his hypertension.      His cardiovascular history includes chronic atrial fibrillation for which he remains on Xarelto for anticoagulation, hypertension, obstructive sleep apnea for which he religiously wears his CPAP and obesity.  Again, he was in to the office in 11/2017 for his routine followup with Dr. Kingston and was noted to be hypertensive at that time.  His metoprolol was discontinued, and he was started on carvedilol 12.5 mg b.i.d.  I saw him in followup in mid December.  At that time, he remained hypertensive and I asked him to increase the carvedilol to 25 mg b.i.d.  He is following up in the office today.      Unfortunately, he has not had time to recheck his blood pressure since our last visit.  He is doing well from a cardiac standpoint, denies any cardiovascular symptoms or limitations.  He denies any side effects.      CURRENT CARDIAC MEDICATIONS:   1.  Carvedilol 25 mg b.i.d.   2.  Lisinopril 20 mg b.i.d.   3.  Xarelto 20 mg daily.      The remainder of his medications, allergies and review of systems were reviewed and as are documented separately.      PHYSICAL EXAMINATION:   GENERAL:  The patient is a pleasant 67-year-old gentleman who appears his stated age.  He is in no apparent distress.   VITAL SIGNS:  Blood pressure was 158/104.  On recheck it was again 158/104.  His weight is 272 pounds.   LUNGS:  Breathing is nonlabored.   CARDIAC:  Reveals an irregular rate and rhythm, no murmurs appreciated.      ASSESSMENT AND PLAN:  The patient is a pleasant 67-year-old gentleman with chronic atrial fibrillation and well-controlled rate, remains on Xarelto for anticoagulation, who has been followed recently due to uncontrolled hypertension.  Unfortunately, his blood pressure did not change much despite the change from metoprolol to carvedilol and up-titration to the  medication.  At this point, I have recommended that we add chlorthalidone and patient is agreeable.  I will plan to see him back in the clinic in about 2-3 weeks with a basic metabolic panel prior to that office visit.  I did encourage him to try to have his blood pressure checked a couple of times in the interim.      Thank you for allowing us to participate in the care of this pleasant patient.         JESSICA HANSEN PA-C             D: 2018 12:51   T: 2018 13:35   MT: ALONZO      Name:     MALIKA CALDERA   MRN:      [id number]        Account:      [id number]   :      1950           Service Date: 2018      Document: [id number]

## 2018-01-08 NOTE — PROGRESS NOTES
Please see separate dictation for HPI, PHYSICAL EXAM AND IMPRESSION/PLAN.    CURRENT MEDICATIONS:  Current Outpatient Prescriptions   Medication Sig Dispense Refill     carvedilol (COREG) 25 MG tablet Take 1 tablet (25 mg) by mouth 2 times daily (with meals) 180 tablet 3     lisinopril (PRINIVIL/ZESTRIL) 20 MG tablet Take 1 tablet (20 mg) by mouth 2 times daily 180 tablet 3     rivaroxaban ANTICOAGULANT (XARELTO) 20 MG TABS tablet Take 1 tablet (20 mg) by mouth daily (with dinner) 90 tablet 3     Multiple Vitamins-Minerals (MULTIVITAMIN PO) Take by mouth daily       ORDER FOR DME Equipment being ordered: CPAP supplies 1 Device 0       ALLERGIES:   No Known Allergies    PAST MEDICAL HISTORY:  Past Medical History:   Diagnosis Date     Benign essential hypertension      Class 2 obesity due to excess calories without serious comorbidity with body mass index (BMI) of 38.0 to 38.9 in adult 12/5/2014     Obstructive sleep apnea syndrome      Permanent atrial fibrillation (H) 11/14/2014    rate control     RBBB (right bundle branch block)        PAST SURGICAL HISTORY:  No past surgical history on file.    SOCIAL HISTORY:  Social History     Social History     Marital status:      Spouse name: N/A     Number of children: N/A     Years of education: N/A     Social History Main Topics     Smoking status: Former Smoker     Packs/day: 0.50     Years: 2.00     Types: Cigarettes     Quit date: 1/1/1971     Smokeless tobacco: Never Used     Alcohol use 1.8 oz/week     3 Standard drinks or equivalent per week      Comment: 1 beer per day     Drug use: No     Sexual activity: Yes     Partners: Female     Other Topics Concern     Caffeine Concern No     none     Sleep Concern Yes     sleep apnea, uses cpap     Stress Concern Yes     health concerns with wife     Weight Concern Yes     wants to loose     Special Diet No     Exercise Yes     walking daily     Social History Narrative       FAMILY HISTORY:  Family History    Problem Relation Age of Onset     Respiratory Father      COPD     Hypertension Mother        Review of Systems:  Skin:  Negative       Eyes:  Positive for glasses readers  ENT:  Positive for nasal congestion;postnasal drainage    Respiratory:  Positive for dyspnea on exertion;sleep apnea;CPAP     Cardiovascular:  Negative      Gastroenterology: Negative      Genitourinary:  Negative      Musculoskeletal:  Negative      Neurologic:  Negative      Psychiatric:  Negative      Heme/Lymph/Imm:  Negative      Endocrine:  Negative         Reviewed. Remainder of the note dictated.    Katina Martinez PA-C

## 2018-01-12 ENCOUNTER — TELEPHONE (OUTPATIENT)
Dept: CARDIOLOGY | Facility: CLINIC | Age: 68
End: 2018-01-12

## 2018-01-12 NOTE — TELEPHONE ENCOUNTER
Call from patient, he saw ROBERT Katina Michelle on 1/18/18 with a new medication added and a plan for 2-3 week f/u. He was unable to get back on her schedule until Feb 27 and asks if he should change the visit to another provider for an earlier check up for BP and labs. Connected patient to scheduling to offer an earlier visit with a alternate ROBERT. Patient notes he can only come on a Thursday or Friday due to his work schedule. He does not have a home BP cuff.

## 2018-01-22 ENCOUNTER — ALLIED HEALTH/NURSE VISIT (OUTPATIENT)
Dept: NURSING | Facility: CLINIC | Age: 68
End: 2018-01-22
Payer: COMMERCIAL

## 2018-01-22 VITALS — DIASTOLIC BLOOD PRESSURE: 84 MMHG | HEART RATE: 74 BPM | SYSTOLIC BLOOD PRESSURE: 148 MMHG

## 2018-01-22 DIAGNOSIS — Z01.30 BP CHECK: Primary | ICD-10-CM

## 2018-01-22 PROCEDURE — 99207 ZZC NO CHARGE NURSE ONLY: CPT

## 2018-01-22 NOTE — PROGRESS NOTES
Tevin Hollis is a 67 year old male who comes in today for a Blood Pressure check because of medication change.    *Document pulse and BP  *Use new set of vitals button for multiple readings.  *Use extended vitals for orthostatic    Vitals as recorded, a large cuff was used.    Patient is taking medication as prescribed  Patient is tolerating medications well.  Patient is not monitoring Blood Pressure at home.  Average readings if yes are     Current complaints: none    Disposition: follow-up as indicated by MD/AP

## 2018-01-22 NOTE — MR AVS SNAPSHOT
After Visit Summary   1/22/2018    Tevin Hollis    MRN: [id number]           Patient Information     Date Of Birth          1950        Visit Information        Provider Department      1/22/2018 11:15 AM RI IM NURSE [facility]        Today's Diagnoses     BP check    -  1       Follow-ups after your visit        Your next 10 appointments already scheduled     Jan 22, 2018 11:15 AM CST   Nurse Only with RI IM NURSE   [facility]    [address]  [facility] [id number] [phone]            Feb 01, 2018  7:45 AM CST   LAB with RU LAB   [facility] AT [place] ([facility])    [address]  [facility] [id number] [phone]           Please do not eat 10-12 hours before your appointment if you are coming in fasting for labs on lipids, cholesterol, or glucose (sugar). This does not apply to pregnant women. Water, hot tea and black coffee (with nothing added) are okay. Do not drink other fluids, diet soda or chew gum.            Feb 01, 2018  8:40 AM CST   Return Visit with DARYA Peralta   [facility] ([facility])    [address]  [facility] [id number] [phone]              Who to contact     If you have questions or need follow up information about today's clinic visit or your schedule please contact [facility] directly at [phone].  Normal or non-critical lab and imaging results will be communicated to you by MyChart, letter or phone within 4 business days after the clinic has received the results. If you do not hear from us within 7 days, please contact the clinic through MyChart or phone. If you have a critical or abnormal lab result, we will notify you by phone as soon as possible.  Submit refill requests through [employer] or call your pharmacy and they will forward  "the refill request to us. Please allow 3 business days for your refill to be completed.          Additional Information About Your Visit        [employer] Information     [employer] lets you send messages to your doctor, view your test results, renew your prescriptions, schedule appointments and more. To sign up, go to [email or web address] . Click on \"Log in\" on the left side of the screen, which will take you to the Welcome page. Then click on \"Sign up Now\" on the right side of the page.     You will be asked to enter the access code listed below, as well as some personal information. Please follow the directions to create your username and password.     Your access code is: [email or web address]  Expires: 3/18/2018  1:37 PM     Your access code will  in 90 days. If you need help or a new code, please call your [place] clinic or [phone].        Care EveryWhere ID     This is your Care EveryWhere ID. This could be used by other organizations to access your [place] medical records  [id number]        Your Vitals Were     Pulse                   74            Blood Pressure from Last 3 Encounters:   18 148/84   18 (!) 158/104   17 158/90    Weight from Last 3 Encounters:   18 272 lb 3.2 oz (123.5 kg)   17 273 lb 9.6 oz (124.1 kg)   17 257 lb 9.6 oz (116.8 kg)              Today, you had the following     No orders found for display       Primary Care Provider Office Phone # Fax #    Alka Thurston [phone]       [address] [facility] [address]        Equal Access to Services     [facility] HospitalCORNELIA : Hadii johnathan montes Soagusto, waaxda luqadaha, qaybta kaalmada wm, sushil rivera. So [facility] [phone].    ATENCIÓN: Si habla español, tiene a guevara disposición servicios gratuitos de asistencia lingüística. Llame al [phone].    We comply with applicable federal civil rights laws and [place] laws. We do not " discriminate on the basis of race, color, national origin, age, disability, sex, sexual orientation, or gender identity.            Thank you!     Thank you for choosing [facility]  for your care. Our goal is always to provide you with excellent care. Hearing back from our patients is one way we can continue to improve our services. Please take a few minutes to complete the written survey that you may receive in the mail after your visit with us. Thank you!             Your Updated Medication List - Protect others around you: Learn how to safely use, store and throw away your medicines at www.disposemymeds.org.          This list is accurate as of: 1/22/18 10:33 AM.  Always use your most recent med list.                   Brand Name Dispense Instructions for use Diagnosis    carvedilol 25 MG tablet    COREG    180 tablet    Take 1 tablet (25 mg) by mouth 2 times daily (with meals)    Chronic atrial fibrillation (H)       chlorthalidone 25 MG tablet    HYGROTON    30 tablet    Take 1 tablet (25 mg) by mouth daily    Benign essential hypertension       lisinopril 20 MG tablet    PRINIVIL/ZESTRIL    180 tablet    Take 1 tablet (20 mg) by mouth 2 times daily        MULTIVITAMIN PO      Take by mouth daily        order for DME     1 Device    Equipment being ordered: CPAP supplies    JESSIE (obstructive sleep apnea)       rivaroxaban ANTICOAGULANT 20 MG Tabs tablet    XARELTO    90 tablet    Take 1 tablet (20 mg) by mouth daily (with dinner)    Permanent atrial fibrillation (H)

## 2018-01-22 NOTE — NURSING NOTE
"Chief Complaint   Patient presents with     Allied Health Visit     BP check due to med changes       Initial /84  Pulse 74 Estimated body mass index is 40.2 kg/(m^2) as calculated from the following:    Height as of 1/8/18: 5' 9\" (1.753 m).    Weight as of 1/8/18: 272 lb 3.2 oz (123.5 kg).  Medication Reconciliation: complete      Kilo Madden CMA      "

## 2018-02-01 ENCOUNTER — OFFICE VISIT (OUTPATIENT)
Dept: CARDIOLOGY | Facility: CLINIC | Age: 68
End: 2018-02-01
Attending: PHYSICIAN ASSISTANT
Payer: COMMERCIAL

## 2018-02-01 VITALS
WEIGHT: 268.5 LBS | SYSTOLIC BLOOD PRESSURE: 140 MMHG | BODY MASS INDEX: 39.77 KG/M2 | HEIGHT: 69 IN | HEART RATE: 68 BPM | DIASTOLIC BLOOD PRESSURE: 90 MMHG

## 2018-02-01 DIAGNOSIS — I10 BENIGN ESSENTIAL HYPERTENSION: Primary | ICD-10-CM

## 2018-02-01 DIAGNOSIS — I48.20 CHRONIC ATRIAL FIBRILLATION (H): ICD-10-CM

## 2018-02-01 DIAGNOSIS — I10 BENIGN ESSENTIAL HYPERTENSION: ICD-10-CM

## 2018-02-01 LAB
ANION GAP SERPL CALCULATED.3IONS-SCNC: 3 MMOL/L (ref 3–14)
BUN SERPL-MCNC: 27 MG/DL (ref 7–30)
CALCIUM SERPL-MCNC: 8.8 MG/DL (ref 8.5–10.1)
CHLORIDE SERPL-SCNC: 104 MMOL/L (ref 94–109)
CO2 SERPL-SCNC: 31 MMOL/L (ref 20–32)
CREAT SERPL-MCNC: 0.95 MG/DL (ref 0.66–1.25)
GFR SERPL CREATININE-BSD FRML MDRD: 80 ML/MIN/1.7M2
GLUCOSE SERPL-MCNC: 119 MG/DL (ref 70–99)
POTASSIUM SERPL-SCNC: 3.7 MMOL/L (ref 3.4–5.3)
SODIUM SERPL-SCNC: 138 MMOL/L (ref 133–144)

## 2018-02-01 PROCEDURE — 99214 OFFICE O/P EST MOD 30 MIN: CPT | Performed by: PHYSICIAN ASSISTANT

## 2018-02-01 PROCEDURE — 36415 COLL VENOUS BLD VENIPUNCTURE: CPT | Performed by: PHYSICIAN ASSISTANT

## 2018-02-01 PROCEDURE — 80048 BASIC METABOLIC PNL TOTAL CA: CPT | Performed by: PHYSICIAN ASSISTANT

## 2018-02-01 RX ORDER — CHLORTHALIDONE 25 MG/1
50 TABLET ORAL DAILY
Qty: 180 TABLET | Refills: 3 | Status: SHIPPED | OUTPATIENT
Start: 2018-02-01

## 2018-02-01 NOTE — PROGRESS NOTES
Cardiology Progress Note    Date of Service: 02/01/2018      Reason for visit: Follow up medication changes, hypertension, chronic afib.    Primary cardiologist: Dr. Oleg Kingston      HPI:  Mr. Hollis is a pleasant 67 year old male with a pertinent medical history including chronic atrial fibrillation on  anticoagulation, hypertension, obstructive sleep apnea for which he is compliant with CPAP. He has no known hx of coronary disease and has declined stress testing in the past as he is asymptomatic in this regard. He was seen last by Dr. Kingston 11/2017 for his routine followup and was noted to be hypertensive. His metoprolol was changed to carvedilol 12.5 mg b.i.d. He was then seen by Katina Martinez PA-C in mid December, at which time he remained hypertensive and the dose was increased to 25 mg b.i.d. Again at follow up in early January his pressures were still high at 158/104. At that time chlorthalidone was addded at 25mg daily. He is here again today for short term follow up, labs, and BP check.    Today his pressures are elevated on arrival at 154/86. However, when I rechecked him with the large sized cuff his systolic BP improved to 140/90.  His labs are unremarkable, with preserved renal function and no electrolyte abnormalities.  Overall, he is feeling well and has no concerns today. He denies any new CP, palpitations, dizziness, LE edema, or orthopnea. He has lost 4 lbs since his last visit, and states he is continuing to work hard on improved dietary changes.       ASSESSMENT/PLAN:    1. Hypertension.   --Still some room for improvement, but making progress. Will increase the chlorthalidone to 50mg daily. Historically he has had normal renal function, confirmed again today with the addition of the diuretic.    --Continue carvedilol 25mg daily, and lisinopril 20mg BID without change.   --Have asked him to come to the clinic and have his BP checked again in a few weeks. Also asked him to remind  staff to use the large size cuff when checking his pressures.     2. Chronic atrial fibrillation.   --Chronically rate controlled, on carvedilol.    --Continue Xarelto without change.    --Continue CPAP for known JESSIE.       Follow up plan:  In 3 months with ROBERT for BP check and repeat BMP, and yearly follow up with Dr. Kingston as planned.      Orders this Visit:  Orders Placed This Encounter   Procedures     Basic metabolic panel     Follow-Up with Cardiac Advanced Practice Provider     Orders Placed This Encounter   Medications     chlorthalidone (HYGROTON) 25 MG tablet     Sig: Take 2 tablets (50 mg) by mouth daily     Dispense:  180 tablet     Refill:  3     Medications Discontinued During This Encounter   Medication Reason     chlorthalidone (HYGROTON) 25 MG tablet Reorder           CURRENT MEDICATIONS:  Current Outpatient Prescriptions   Medication Sig Dispense Refill     chlorthalidone (HYGROTON) 25 MG tablet Take 2 tablets (50 mg) by mouth daily 180 tablet 3     [DISCONTINUED] chlorthalidone (HYGROTON) 25 MG tablet Take 1 tablet (25 mg) by mouth daily 30 tablet 3     carvedilol (COREG) 25 MG tablet Take 1 tablet (25 mg) by mouth 2 times daily (with meals) 180 tablet 3     lisinopril (PRINIVIL/ZESTRIL) 20 MG tablet Take 1 tablet (20 mg) by mouth 2 times daily 180 tablet 3     rivaroxaban ANTICOAGULANT (XARELTO) 20 MG TABS tablet Take 1 tablet (20 mg) by mouth daily (with dinner) 90 tablet 3     Multiple Vitamins-Minerals (MULTIVITAMIN PO) Take by mouth daily       ORDER FOR DME Equipment being ordered: CPAP supplies 1 Device 0       ALLERGIES   No Known Allergies    PAST MEDICAL HISTORY:  Past Medical History:   Diagnosis Date     Benign essential hypertension      Class 2 obesity due to excess calories without serious comorbidity with body mass index (BMI) of 38.0 to 38.9 in adult 12/5/2014     Obstructive sleep apnea syndrome      Permanent atrial fibrillation (H) 11/14/2014    rate control     RBBB (right  "bundle branch block)        PAST SURGICAL HISTORY:  History reviewed. No pertinent surgical history.    FAMILY HISTORY:  Family History   Problem Relation Age of Onset     Respiratory Father      COPD     Hypertension Mother        SOCIAL HISTORY:  Social History     Social History     Marital status:      Spouse name: N/A     Number of children: N/A     Years of education: N/A     Social History Main Topics     Smoking status: Former Smoker     Packs/day: 0.50     Years: 2.00     Types: Cigarettes     Quit date: 1/1/1971     Smokeless tobacco: Never Used     Alcohol use 1.8 oz/week     3 Standard drinks or equivalent per week      Comment: 1 beer per day     Drug use: No     Sexual activity: Yes     Partners: Female     Other Topics Concern     Caffeine Concern No     none     Sleep Concern Yes     sleep apnea, uses cpap     Stress Concern Yes     health concerns with wife     Weight Concern Yes     wants to loose     Special Diet No     Exercise Yes     walking daily     Social History Narrative       Review of Systems:  Cardiovascular: negative for chest pain, palpitations, orthopnea, LE edema  Constitutional: negative for chills, sweats, fevers   Resp: Negative for dyspnea at rest, dyspnea on exertion, cough, wheezing, hemoptysis, known chronic lung disease  HEENT: Negative for new visual changes, frequent headaches  Gastrointestinal: negative for abdominal pain, diarrhea, nausea, vomiting  Hematologic/lymphatic: pos for current systemic anticoagulation, neg hx of blood clots  Musculoskeletal: negative for new back pain, joint pain  Neurological: negative for focal weakness, LOC, seizures, syncope      Physical Exam:  Vitals: /90  Pulse 68  Ht 1.753 m (5' 9\")  Wt 121.8 kg (268 lb 8 oz)  BMI 39.65 kg/m2   Wt Readings from Last 4 Encounters:   02/01/18 121.8 kg (268 lb 8 oz)   01/08/18 123.5 kg (272 lb 3.2 oz)   12/18/17 124.1 kg (273 lb 9.6 oz)   11/13/17 116.8 kg (257 lb 9.6 oz)       GEN:  In " general, this is a well nourished  male in no acute distress on room air.  Patient ambulatory.  HEENT:  Pupils equal, round. Sclerae nonicteric. Clear oropharynx. Mucous membranes moist.  NECK: Supple, no masses appreciated. Trachea midline. No JVD with patient upright.  C/V:  Irregular rate and rhythm. No murmur, rub or gallop.    RESP: Respirations are unlabored. No use of accessory muscles. Clear to auscultation bilaterally without wheezing, rales, or rhonchi.  GI: Abdomen soft, nontender, nondistended.    EXTREM: No pitting LE edema. No cyanosis or clubbing.  NEURO: Alert and oriented, cooperative. Gait normal. No obvious focal deficits.   PSYCH: Normal affect.  SKIN: Warm and dry. No rashes or petechiae appreciated.       Recent Lab Results:    BMP RESULTS:  Lab Results   Component Value Date     02/01/2018    POTASSIUM 3.7 02/01/2018    CHLORIDE 104 02/01/2018    CO2 31 02/01/2018    ANIONGAP 3 02/01/2018     (H) 02/01/2018    BUN 27 02/01/2018    CR 0.95 02/01/2018    GFRESTIMATED 80 02/01/2018    GFRESTBLACK >90 02/01/2018    TRAVIS 8.8 02/01/2018        Dulce Lopez PA-C  [facility]  Pager [phone]

## 2018-02-01 NOTE — PATIENT INSTRUCTIONS
Visit Summary:    Today we discussed:   Your blood pressure is still a little high but we are making progress.    Medication changes:    INCREASE the chlorthlidone to 50mg daily.    Please have your BP checked again in the office in ~2 weeks.   Call if you feel dizzy, lightheaded, etc     Test results:   Results for MALIKA CALDERA (MRN [id number]) as of 2/1/2018 08:47   Ref. Range 2/1/2018 07:34   Sodium Latest Ref Range: 133 - 144 mmol/L 138   Potassium Latest Ref Range: 3.4 - 5.3 mmol/L 3.7   Chloride Latest Ref Range: 94 - 109 mmol/L 104   Carbon Dioxide Latest Ref Range: 20 - 32 mmol/L 31   Urea Nitrogen Latest Ref Range: 7 - 30 mg/dL 27   Creatinine Latest Ref Range: 0.66 - 1.25 mg/dL 0.95   GFR Estimate Latest Ref Range: >60 mL/min/1.7m2 80   GFR Estimate If Black Latest Ref Range: >60 mL/min/1.7m2 >90   Calcium Latest Ref Range: 8.5 - 10.1 mg/dL 8.8   Anion Gap Latest Ref Range: 3 - 14 mmol/L 3       Follow up:   With Katina or Dulce in 3 months for repeat BP check and kidney labs.   With Dr. Kingston in November as planned.

## 2018-02-01 NOTE — MR AVS SNAPSHOT
After Visit Summary   2/1/2018    Malika Hollis    MRN: [id number]           Patient Information     Date Of Birth          1950        Visit Information        Provider Department      2/1/2018 8:40 AM Dulce Lopez PA [facility]        Today's Diagnoses     Benign essential hypertension          Care Instructions    Visit Summary:    Today we discussed:   Your blood pressure is still a little high but we are making progress.    Medication changes:    INCREASE the chlorthlidone to 50mg daily.    Please have your BP checked again in the office in ~2 weeks.   Call if you feel dizzy, lightheaded, etc     Test results:   Results for MALIKA HOLLIS (MRN [id number]) as of 2/1/2018 08:47   Ref. Range 2/1/2018 07:34   Sodium Latest Ref Range: 133 - 144 mmol/L 138   Potassium Latest Ref Range: 3.4 - 5.3 mmol/L 3.7   Chloride Latest Ref Range: 94 - 109 mmol/L 104   Carbon Dioxide Latest Ref Range: 20 - 32 mmol/L 31   Urea Nitrogen Latest Ref Range: 7 - 30 mg/dL 27   Creatinine Latest Ref Range: 0.66 - 1.25 mg/dL 0.95   GFR Estimate Latest Ref Range: >60 mL/min/1.7m2 80   GFR Estimate If Black Latest Ref Range: >60 mL/min/1.7m2 >90   Calcium Latest Ref Range: 8.5 - 10.1 mg/dL 8.8   Anion Gap Latest Ref Range: 3 - 14 mmol/L 3       Follow up:   With Katina or Dulce in 3 months for repeat BP check and kidney labs.   With Dr. Kingston in November as planned.                 Follow-ups after your visit        Additional Services     Follow-Up with Cardiac Advanced Practice Provider                 Future tests that were ordered for you today     Open Future Orders        Priority Expected Expires Ordered    Basic metabolic panel Routine 5/1/2018 2/1/2019 2/1/2018    Follow-Up with Cardiac Advanced Practice Provider Routine 5/2/2018 2/1/2019 2/1/2018            Who to contact     If you have questions or need follow up information about today's clinic visit or  "your schedule please contact [facility] directly at [phone].  Normal or non-critical lab and imaging results will be communicated to you by MyChart, letter or phone within 4 business days after the clinic has received the results. If you do not hear from us within 7 days, please contact the clinic through [employer] or phone. If you have a critical or abnormal lab result, we will notify you by phone as soon as possible.  Submit refill requests through [employer] or call your pharmacy and they will forward the refill request to us. Please allow 3 business days for your refill to be completed.          Additional Information About Your Visit        [employer]ell[employer] Information     [employer] lets you send messages to your doctor, view your test results, renew your prescriptions, schedule appointments and more. To sign up, go to [email or web address] . Click on \"Log in\" on the left side of the screen, which will take you to the Welcome page. Then click on \"Sign up Now\" on the right side of the page.     You will be asked to enter the access code listed below, as well as some personal information. Please follow the directions to create your username and password.     Your access code is: [email or web address]  Expires: 3/18/2018  1:37 PM     Your access code will  in 90 days. If you need help or a new code, please call your [facility] clinic or [phone].        Care EveryWhere ID     This is your Care EveryWhere ID. This could be used by other organizations to access your [facility] medical records  [id number]        Your Vitals Were     Pulse Height BMI (Body Mass Index)             68 1.753 m (5' 9\") 39.65 kg/m2          Blood Pressure from Last 3 Encounters:   18 140/90   18 148/84   18 (!) 158/104    Weight from Last 3 Encounters:   18 121.8 kg (268 lb 8 oz)   18 123.5 kg (272 lb 3.2 oz)   17 124.1 kg (273 lb 9.6 oz)              We Performed the " Following     Follow-Up with Cardiac Advanced Practice Provider          Today's Medication Changes          These changes are accurate as of 2/1/18  8:58 AM.  If you have any questions, ask your nurse or doctor.               These medicines have changed or have updated prescriptions.        Dose/Directions    chlorthalidone 25 MG tablet   Commonly known as:  HYGROTON   This may have changed:  how much to take   Used for:  Benign essential hypertension   Changed by:  Dulce Lopez PA        Dose:  50 mg   Take 2 tablets (50 mg) by mouth daily   Quantity:  180 tablet   Refills:  3            Where to get your medicines      These medications [facility] [address] [facility] [place] [facility] [address]  [address], [place] [address]     Phone:  [phone]     chlorthalidone 25 MG tablet                Primary Care Provider Office Phone # Fax #    Alka Thurston [phone]       [address] [facility] [address]        Equal Access to Services     STEFANO ZHU AH: Hadii aad ku hadasho Soomaali, waaxda luqadaha, qaybta kaalmada adeegyada, waxay idiin hayaan ronald kharadorian macdonald . So [facility] [phone].    ATENCIÓN: Si habla español, tiene a guevara disposición servicios gratuitos de asistencia lingüística. Tommyame al [phone].    We comply with applicable federal civil rights laws and [place] laws. We do not discriminate on the basis of race, color, national origin, age, disability, sex, sexual orientation, or gender identity.            Thank you!     Thank you for choosing [facility]  for your care. Our goal is always to provide you with excellent care. Hearing back from our patients is one way we can continue to improve our services. Please take a few minutes to complete the written survey that you may receive in the mail after your visit with us. Thank you!             Your Updated Medication List - Protect others around  you: Learn how to safely use, store and throw away your medicines at www.disposemymeds.org.          This list is accurate as of 2/1/18  8:58 AM.  Always use your most recent med list.                   Brand Name Dispense Instructions for use Diagnosis    carvedilol 25 MG tablet    COREG    180 tablet    Take 1 tablet (25 mg) by mouth 2 times daily (with meals)    Chronic atrial fibrillation (H)       chlorthalidone 25 MG tablet    HYGROTON    180 tablet    Take 2 tablets (50 mg) by mouth daily    Benign essential hypertension       lisinopril 20 MG tablet    PRINIVIL/ZESTRIL    180 tablet    Take 1 tablet (20 mg) by mouth 2 times daily        MULTIVITAMIN PO      Take by mouth daily        order for DME     1 Device    Equipment being ordered: CPAP supplies    JESSIE (obstructive sleep apnea)       rivaroxaban ANTICOAGULANT 20 MG Tabs tablet    XARELTO    90 tablet    Take 1 tablet (20 mg) by mouth daily (with dinner)    Permanent atrial fibrillation (H)

## 2018-02-01 NOTE — LETTER
2/1/2018    Alka Thurston MD  [address] [facility] [address]    RE: Tevin Steinerel       Dear Colleague,    I had the pleasure of seeing Tevin Hollis in the [facility].      Cardiology Progress Note    Date of Service: 02/01/2018      Reason for visit: Follow up medication changes, hypertension, chronic afib.    Primary cardiologist: Dr. Oleg Kingston      HPI:  Mr. Hollis is a pleasant 67 year old male with a pertinent medical history including chronic atrial fibrillation on  anticoagulation, hypertension, obstructive sleep apnea for which he is compliant with CPAP. He has no known hx of coronary disease and has declined stress testing in the past as he is asymptomatic in this regard. He was seen last by Dr. Kingston 11/2017 for his routine followup and was noted to be hypertensive. His metoprolol was changed to carvedilol 12.5 mg b.i.d. He was then seen by Katina Martinez PA-C in mid December, at which time he remained hypertensive and the dose was increased to 25 mg b.i.d. Again at follow up in early January his pressures were still high at 158/104. At that time chlorthalidone was addded at 25mg daily. He is here again today for short term follow up, labs, and BP check.    Today his pressures are elevated on arrival at 154/86. However, when I rechecked him with the large sized cuff his systolic BP improved to 140/90.  His labs are unremarkable, with preserved renal function and no electrolyte abnormalities.  Overall, he is feeling well and has no concerns today. He denies any new CP, palpitations, dizziness, LE edema, or orthopnea. He has lost 4 lbs since his last visit, and states he is continuing to work hard on improved dietary changes.       ASSESSMENT/PLAN:    1. Hypertension.   --Still some room for improvement, but making progress. Will increase the chlorthalidone to 50mg daily. Historically he has had normal renal function, confirmed again today with  the addition of the diuretic.    --Continue carvedilol 25mg daily, and lisinopril 20mg BID without change.   --Have asked him to come to the clinic and have his BP checked again in a few weeks. Also asked him to remind staff to use the large size cuff when checking his pressures.     2. Chronic atrial fibrillation.   --Chronically rate controlled, on carvedilol.    --Continue Xarelto without change.    --Continue CPAP for known JESSIE.       Follow up plan:  In 3 months with ROBERT for BP check and repeat BMP, and yearly follow up with Dr. Kingston as planned.      Orders this Visit:  Orders Placed This Encounter   Procedures     Basic metabolic panel     Follow-Up with Cardiac Advanced Practice Provider     Orders Placed This Encounter   Medications     chlorthalidone (HYGROTON) 25 MG tablet     Sig: Take 2 tablets (50 mg) by mouth daily     Dispense:  180 tablet     Refill:  3     Medications Discontinued During This Encounter   Medication Reason     chlorthalidone (HYGROTON) 25 MG tablet Reorder           CURRENT MEDICATIONS:  Current Outpatient Prescriptions   Medication Sig Dispense Refill     chlorthalidone (HYGROTON) 25 MG tablet Take 2 tablets (50 mg) by mouth daily 180 tablet 3     [DISCONTINUED] chlorthalidone (HYGROTON) 25 MG tablet Take 1 tablet (25 mg) by mouth daily 30 tablet 3     carvedilol (COREG) 25 MG tablet Take 1 tablet (25 mg) by mouth 2 times daily (with meals) 180 tablet 3     lisinopril (PRINIVIL/ZESTRIL) 20 MG tablet Take 1 tablet (20 mg) by mouth 2 times daily 180 tablet 3     rivaroxaban ANTICOAGULANT (XARELTO) 20 MG TABS tablet Take 1 tablet (20 mg) by mouth daily (with dinner) 90 tablet 3     Multiple Vitamins-Minerals (MULTIVITAMIN PO) Take by mouth daily       ORDER FOR DME Equipment being ordered: CPAP supplies 1 Device 0       ALLERGIES   No Known Allergies    PAST MEDICAL HISTORY:  Past Medical History:   Diagnosis Date     Benign essential hypertension      Class 2 obesity due to excess  "calories without serious comorbidity with body mass index (BMI) of 38.0 to 38.9 in adult 12/5/2014     Obstructive sleep apnea syndrome      Permanent atrial fibrillation (H) 11/14/2014    rate control     RBBB (right bundle branch block)        PAST SURGICAL HISTORY:  History reviewed. No pertinent surgical history.    FAMILY HISTORY:  Family History   Problem Relation Age of Onset     Respiratory Father      COPD     Hypertension Mother        SOCIAL HISTORY:  Social History     Social History     Marital status:      Spouse name: N/A     Number of children: N/A     Years of education: N/A     Social History Main Topics     Smoking status: Former Smoker     Packs/day: 0.50     Years: 2.00     Types: Cigarettes     Quit date: 1/1/1971     Smokeless tobacco: Never Used     Alcohol use 1.8 oz/week     3 Standard drinks or equivalent per week      Comment: 1 beer per day     Drug use: No     Sexual activity: Yes     Partners: Female     Other Topics Concern     Caffeine Concern No     none     Sleep Concern Yes     sleep apnea, uses cpap     Stress Concern Yes     health concerns with wife     Weight Concern Yes     wants to loose     Special Diet No     Exercise Yes     walking daily     Social History Narrative       Review of Systems:  Cardiovascular: negative for chest pain, palpitations, orthopnea, LE edema  Constitutional: negative for chills, sweats, fevers   Resp: Negative for dyspnea at rest, dyspnea on exertion, cough, wheezing, hemoptysis, known chronic lung disease  HEENT: Negative for new visual changes, frequent headaches  Gastrointestinal: negative for abdominal pain, diarrhea, nausea, vomiting  Hematologic/lymphatic: pos for current systemic anticoagulation, neg hx of blood clots  Musculoskeletal: negative for new back pain, joint pain  Neurological: negative for focal weakness, LOC, seizures, syncope      Physical Exam:  Vitals: /90  Pulse 68  Ht 1.753 m (5' 9\")  Wt 121.8 kg (268 lb 8 " oz)  BMI 39.65 kg/m2   Wt Readings from Last 4 Encounters:   02/01/18 121.8 kg (268 lb 8 oz)   01/08/18 123.5 kg (272 lb 3.2 oz)   12/18/17 124.1 kg (273 lb 9.6 oz)   11/13/17 116.8 kg (257 lb 9.6 oz)       GEN:  In general, this is a well nourished  male in no acute distress on room air.  Patient ambulatory.  HEENT:  Pupils equal, round. Sclerae nonicteric. Clear oropharynx. Mucous membranes moist.  NECK: Supple, no masses appreciated. Trachea midline. No JVD with patient upright.  C/V:  Irregular rate and rhythm. No murmur, rub or gallop.    RESP: Respirations are unlabored. No use of accessory muscles. Clear to auscultation bilaterally without wheezing, rales, or rhonchi.  GI: Abdomen soft, nontender, nondistended.    EXTREM: No pitting LE edema. No cyanosis or clubbing.  NEURO: Alert and oriented, cooperative. Gait normal. No obvious focal deficits.   PSYCH: Normal affect.  SKIN: Warm and dry. No rashes or petechiae appreciated.       Recent Lab Results:    BMP RESULTS:  Lab Results   Component Value Date     02/01/2018    POTASSIUM 3.7 02/01/2018    CHLORIDE 104 02/01/2018    CO2 31 02/01/2018    ANIONGAP 3 02/01/2018     (H) 02/01/2018    BUN 27 02/01/2018    CR 0.95 02/01/2018    GFRESTIMATED 80 02/01/2018    GFRESTBLACK >90 02/01/2018    TRAVIS 8.8 02/01/2018        Dulce Lopez PA-C  [facility]  Pager [phone]      Thank you for allowing me to participate in the care of your patient.    Sincerely,     DARYA Peralta     [facility]    cc:   Katina Martinez PA-C  [address] [place] [address]

## 2018-02-22 ENCOUNTER — ALLIED HEALTH/NURSE VISIT (OUTPATIENT)
Dept: NURSING | Facility: CLINIC | Age: 68
End: 2018-02-22
Payer: COMMERCIAL

## 2018-02-22 DIAGNOSIS — I48.21 PERMANENT ATRIAL FIBRILLATION (H): Primary | ICD-10-CM

## 2018-02-22 PROCEDURE — 99207 ZZC NO CHARGE NURSE ONLY: CPT

## 2018-02-22 NOTE — MR AVS SNAPSHOT
"              After Visit Summary   2018    Tevin Hollis    MRN: [id number]           Patient Information     Date Of Birth          1950        Visit Information        Provider Department      2018 10:30 AM RI IM NURSE [facility]        Today's Diagnoses     Permanent atrial fibrillation (H)    -  1       Follow-ups after your visit        Who to contact     If you have questions or need follow up information about today's clinic visit or your schedule please contact [facility] directly at [phone].  Normal or non-critical lab and imaging results will be communicated to you by Mysafeplacehart, letter or phone within 4 business days after the clinic has received the results. If you do not hear from us within 7 days, please contact the clinic through Mysafeplacehart or phone. If you have a critical or abnormal lab result, we will notify you by phone as soon as possible.  Submit refill requests through Signal Vine or call your pharmacy and they will forward the refill request to us. Please allow 3 business days for your refill to be completed.          Additional Information About Your Visit        MyChart Information     Signal Vine lets you send messages to your doctor, view your test results, renew your prescriptions, schedule appointments and more. To sign up, go to [email or web address] . Click on \"Log in\" on the left side of the screen, which will take you to the Welcome page. Then click on \"Sign up Now\" on the right side of the page.     You will be asked to enter the access code listed below, as well as some personal information. Please follow the directions to create your username and password.     Your access code is: [email or web address]  Expires: 3/18/2018  1:37 PM     Your access code will  in 90 days. If you need help or a new code, please call your [facility] or [phone].        Care EveryWhere ID     This is your Care EveryWhere ID. This could be used by other " organizations to access your [place] medical records  [id number]         Blood Pressure from Last 3 Encounters:   02/01/18 140/90   01/22/18 148/84   01/08/18 (!) 158/104    Weight from Last 3 Encounters:   02/01/18 268 lb 8 oz (121.8 kg)   01/08/18 272 lb 3.2 oz (123.5 kg)   12/18/17 273 lb 9.6 oz (124.1 kg)              Today, you had the following     No orders found for display       Primary Care Provider Office Phone # Fax #    Alka Thurston [phone]       [address] [facility] [address]        Equal Access to Services     STEFANO ZHU : Hadii johnathan carmonao Soagusto, waaxda luqadaha, qaybta kaalmada adejoséyarona, sushil rivera. So [facility] [phone].    ATENCIÓN: Si habla español, tiene a guevara disposición servicios gratuitos de asistencia lingüística. Llame al [phone].    We comply with applicable federal civil rights laws and [place] laws. We do not discriminate on the basis of race, color, national origin, age, disability, sex, sexual orientation, or gender identity.            Thank you!     Thank you for choosing [facility]  for your care. Our goal is always to provide you with excellent care. Hearing back from our patients is one way we can continue to improve our services. Please take a few minutes to complete the written survey that you may receive in the mail after your visit with us. Thank you!             Your Updated Medication List - Protect others around you: Learn how to safely use, store and throw away your medicines at www.disposemymeds.org.          This list is accurate as of 2/22/18 10:47 AM.  Always use your most recent med list.                   Brand Name Dispense Instructions for use Diagnosis    carvedilol 25 MG tablet    COREG    180 tablet    Take 1 tablet (25 mg) by mouth 2 times daily (with meals)    Chronic atrial fibrillation (H)       chlorthalidone 25 MG tablet    HYGROTON    180 tablet    Take  2 tablets (50 mg) by mouth daily    Benign essential hypertension       lisinopril 20 MG tablet    PRINIVIL/ZESTRIL    180 tablet    Take 1 tablet (20 mg) by mouth 2 times daily        MULTIVITAMIN PO      Take by mouth daily        order for DME     1 Device    Equipment being ordered: CPAP supplies    JESSIE (obstructive sleep apnea)       rivaroxaban ANTICOAGULANT 20 MG Tabs tablet    XARELTO    90 tablet    Take 1 tablet (20 mg) by mouth daily (with dinner)    Permanent atrial fibrillation (H)

## 2018-02-22 NOTE — NURSING NOTE
Tevin Hollis is a 67 year old male who comes in today for a Blood Pressure check because of ongoing blood pressure monitoring.        Vitals as recorded, a large cuff was used.    Patient is taking medication as prescribed  Patient is tolerating medications well.  Patient is not monitoring Blood Pressure at home.     Current complaints: none    Disposition: results routed to MD/LAVINIA Simon MA

## 2018-02-23 VITALS — SYSTOLIC BLOOD PRESSURE: 110 MMHG | DIASTOLIC BLOOD PRESSURE: 60 MMHG

## 2018-04-30 DIAGNOSIS — I48.21 PERMANENT ATRIAL FIBRILLATION (H): ICD-10-CM

## 2018-05-24 ENCOUNTER — OFFICE VISIT (OUTPATIENT)
Dept: CARDIOLOGY | Facility: CLINIC | Age: 68
End: 2018-05-24
Attending: PHYSICIAN ASSISTANT
Payer: COMMERCIAL

## 2018-05-24 VITALS
HEIGHT: 69 IN | HEART RATE: 64 BPM | DIASTOLIC BLOOD PRESSURE: 80 MMHG | SYSTOLIC BLOOD PRESSURE: 128 MMHG | WEIGHT: 268.5 LBS | BODY MASS INDEX: 39.77 KG/M2

## 2018-05-24 DIAGNOSIS — I10 BENIGN ESSENTIAL HYPERTENSION: ICD-10-CM

## 2018-05-24 DIAGNOSIS — I48.20 CHRONIC ATRIAL FIBRILLATION (H): ICD-10-CM

## 2018-05-24 DIAGNOSIS — I10 BENIGN ESSENTIAL HYPERTENSION: Primary | ICD-10-CM

## 2018-05-24 LAB
ANION GAP SERPL CALCULATED.3IONS-SCNC: 6 MMOL/L (ref 3–14)
BUN SERPL-MCNC: 31 MG/DL (ref 7–30)
CALCIUM SERPL-MCNC: 8.9 MG/DL (ref 8.5–10.1)
CHLORIDE SERPL-SCNC: 106 MMOL/L (ref 94–109)
CO2 SERPL-SCNC: 28 MMOL/L (ref 20–32)
CREAT SERPL-MCNC: 0.97 MG/DL (ref 0.66–1.25)
GFR SERPL CREATININE-BSD FRML MDRD: 78 ML/MIN/1.7M2
GLUCOSE SERPL-MCNC: 133 MG/DL (ref 70–99)
POTASSIUM SERPL-SCNC: 3.7 MMOL/L (ref 3.4–5.3)
SODIUM SERPL-SCNC: 140 MMOL/L (ref 133–144)

## 2018-05-24 PROCEDURE — 80048 BASIC METABOLIC PNL TOTAL CA: CPT | Performed by: PHYSICIAN ASSISTANT

## 2018-05-24 PROCEDURE — 36415 COLL VENOUS BLD VENIPUNCTURE: CPT | Performed by: PHYSICIAN ASSISTANT

## 2018-05-24 PROCEDURE — 99214 OFFICE O/P EST MOD 30 MIN: CPT | Performed by: PHYSICIAN ASSISTANT

## 2018-05-24 NOTE — PROGRESS NOTES
Please see separate dictation for HPI, PHYSICAL EXAM AND IMPRESSION/PLAN.    CURRENT MEDICATIONS:  Current Outpatient Prescriptions   Medication Sig Dispense Refill     carvedilol (COREG) 25 MG tablet Take 1 tablet (25 mg) by mouth 2 times daily (with meals) 180 tablet 3     chlorthalidone (HYGROTON) 25 MG tablet Take 2 tablets (50 mg) by mouth daily 180 tablet 3     lisinopril (PRINIVIL/ZESTRIL) 20 MG tablet Take 1 tablet (20 mg) by mouth 2 times daily 180 tablet 3     Multiple Vitamins-Minerals (MULTIVITAMIN PO) Take by mouth daily       ORDER FOR DME Equipment being ordered: CPAP supplies 1 Device 0     rivaroxaban ANTICOAGULANT (XARELTO) 20 MG TABS tablet Take 1 tablet (20 mg) by mouth daily (with dinner) 90 tablet 3       ALLERGIES:   No Known Allergies    PAST MEDICAL HISTORY:  Past Medical History:   Diagnosis Date     Benign essential hypertension      Class 2 obesity due to excess calories without serious comorbidity with body mass index (BMI) of 38.0 to 38.9 in adult 12/5/2014     Obstructive sleep apnea syndrome      Permanent atrial fibrillation (H) 11/14/2014    rate control     RBBB (right bundle branch block)        PAST SURGICAL HISTORY:  History reviewed. No pertinent surgical history.    SOCIAL HISTORY:  Social History     Social History     Marital status:      Spouse name: N/A     Number of children: N/A     Years of education: N/A     Social History Main Topics     Smoking status: Former Smoker     Packs/day: 0.50     Years: 2.00     Types: Cigarettes     Quit date: 1/1/1971     Smokeless tobacco: Never Used     Alcohol use 1.8 oz/week     3 Standard drinks or equivalent per week      Comment: 1 beer per day     Drug use: No     Sexual activity: Yes     Partners: Female     Other Topics Concern     Caffeine Concern No     none     Sleep Concern Yes     sleep apnea, uses cpap     Stress Concern Yes     health concerns with wife     Weight Concern Yes     wants to loose     Special Diet No      Exercise Yes     walking daily     Social History Narrative       FAMILY HISTORY:  Family History   Problem Relation Age of Onset     Respiratory Father      COPD     Hypertension Mother        Review of Systems:  Skin:  Negative       Eyes:  Positive for glasses readers  ENT:  Negative      Respiratory:  Positive for sleep apnea;CPAP     Cardiovascular:  Negative      Gastroenterology: Negative      Genitourinary:  Negative      Musculoskeletal:  Negative      Neurologic:  Negative      Psychiatric:  Negative      Heme/Lymph/Imm:  Negative      Endocrine:  Negative         Reviewed. Remainder of the note dictated.    Katina Martinez PA-C

## 2018-05-24 NOTE — MR AVS SNAPSHOT
After Visit Summary   5/24/2018    Tevin Hollis    MRN: [id number]           Patient Information     Date Of Birth          1950        Visit Information        Provider Department      5/24/2018 10:00 AM Katina Martinez PA-C [facility]        Today's Diagnoses     Benign essential hypertension    -  1    Chronic atrial fibrillation (H)          Care Instructions    Thank you for your  Heart Care visit today. Your provider has recommended the following:  Medication Changes:  No changes today  Recommendations:  Work on increasing your activity and watching portion size  Follow-up:  See Dr Kingston for cardiology follow up in Nov as planned.    Reminder:  Please bring in your current medication list or your medication, over the counter supplements and vitamin bottles as we will review these at each office visit.                  Follow-ups after your visit        Who to contact     If you have questions or need follow up information about today's clinic visit or your schedule please contact [facility] directly at [phone].  Normal or non-critical lab and imaging results will be communicated to you by MyChart, letter or phone within 4 business days after the clinic has received the results. If you do not hear from us within 7 days, please contact the clinic through MyChart or phone. If you have a critical or abnormal lab result, we will notify you by phone as soon as possible.  Submit refill requests through Infobright or call your pharmacy and they will forward the refill request to us. Please allow 3 business days for your refill to be completed.          Additional Information About Your Visit        Care EveryWhere ID     This is your Care EveryWhere ID. This could be used by other organizations to access your [place] medical records  [id number]        Your Vitals Were     Pulse Height BMI (Body  "Mass Index)             64 1.753 m (5' 9\") 39.65 kg/m2          Blood Pressure from Last 3 Encounters:   05/24/18 128/80   02/22/18 110/60   02/01/18 140/90    Weight from Last 3 Encounters:   05/24/18 121.8 kg (268 lb 8 oz)   02/01/18 121.8 kg (268 lb 8 oz)   01/08/18 123.5 kg (272 lb 3.2 oz)              We Performed the Following     Follow-Up with Cardiac Advanced Practice Provider        Primary Care Provider Office Phone # Fax #    Alka Thurston [phone]       [address] [facility] [address]        Equal Access to Services     HELGA ZHU : Hadii johnathan carmonao Serena, waaxda luqadaha, qaybta kaalmada adejoséyada, sushil macdonald . So [facility] [phone].    ATENCIÓN: Si habla español, tiene a guevara disposición servicios gratuitos de asistencia lingüística. Llame al [phone].    We comply with applicable federal civil rights laws and [place] laws. We do not discriminate on the basis of race, color, national origin, age, disability, sex, sexual orientation, or gender identity.            Thank you!     Thank you for choosing [facility]  for your care. Our goal is always to provide you with excellent care. Hearing back from our patients is one way we can continue to improve our services. Please take a few minutes to complete the written survey that you may receive in the mail after your visit with us. Thank you!             Your Updated Medication List - Protect others around you: Learn how to safely use, store and throw away your medicines at www.disposemymeds.org.          This list is accurate as of 5/24/18 10:19 AM.  Always use your most recent med list.                   Brand Name Dispense Instructions for use Diagnosis    carvedilol 25 MG tablet    COREG    180 tablet    Take 1 tablet (25 mg) by mouth 2 times daily (with meals)    Chronic atrial fibrillation (H)       chlorthalidone 25 MG tablet    " HYGROTON    180 tablet    Take 2 tablets (50 mg) by mouth daily    Benign essential hypertension       lisinopril 20 MG tablet    PRINIVIL/ZESTRIL    180 tablet    Take 1 tablet (20 mg) by mouth 2 times daily        MULTIVITAMIN PO      Take by mouth daily        order for DME     1 Device    Equipment being ordered: CPAP supplies    JESSIE (obstructive sleep apnea)       rivaroxaban ANTICOAGULANT 20 MG Tabs tablet    XARELTO    90 tablet    Take 1 tablet (20 mg) by mouth daily (with dinner)    Permanent atrial fibrillation (H)

## 2018-05-24 NOTE — PROGRESS NOTES
Service Date: 05/24/2018      HISTORY OF PRESENT ILLNESS:  Mr. Hollis is a pleasant 67-year-old gentleman who presents to the Cardiology office today for followup on his hypertension.      His cardiovascular history includes chronic atrial fibrillation for which he remains on Xarelto for anticoagulation, hypertension, obstructive sleep apnea for which he religiously wears his CPAP, and obesity.  In late 2017 or early 2018, he was having some trouble with elevated blood pressures.  We up-titrated his carvedilol and started him on chlorthalidone and his blood pressure has been quite well controlled since then.      Overall, he is doing well from a cardiac standpoint.  He denies any cardiovascular symptoms or limitations such as chest discomfort, dyspnea on exertion, palpitations, orthopnea, PND or lower extremity edema.  He denies any medication side effects.  He really does not have any cardiac concerns or questions at this time.      CURRENT CARDIAC MEDICATIONS:   1.  Carvedilol 25 mg b.i.d.   2.  Chlorthalidone 25 mg 2 tablets daily.   3.  Lisinopril 20 mg b.i.d.   4.  Xarelto 20 mg once daily.      The remainder of his medications, allergies and review of systems were reviewed and as are documented separately.      PHYSICAL EXAMINATION:   GENERAL:  The patient is a 67-year-old gentleman who appears his stated age.  He is in no apparent distress.   VITAL SIGNS:  His blood pressure was 128/80, pulse 64, weight is 268 pounds, which is stable since his last office visit.   LUNGS:  Clear to auscultation and his breathing is nonlabored.   CARDIAC:  Reveals an irregular rhythm with well controlled rate.  No murmur is appreciated.  No lower extremity edema is appreciated.   NEUROLOGIC:  Alert and oriented.      LABORATORY STUDIES:  He had a basic metabolic panel today.  His sodium is 140, potassium is 3.7, BUN is 31 and creatinine is 0.97.      ASSESSMENT AND PLAN:  Castro is a 67-year-old gentleman with chronic atrial  fibrillation and hypertension who presents for a blood pressure followup.  Overall, his blood pressure looks quite well controlled.  We will have to keep a close eye on his basic metabolic panel with the use of chlorthalidone and lisinopril.  For the time being, I recommended continuing his current medication regimen as is.  He does occasionally stop in to his local [place] office to have his blood pressure checked and I encouraged him to continue to do this.  We also discussed trying to increase physical activity and watch portion size in order to attempt weight loss.      He will follow up as previously planned with Dr. Kingston in November for his annual visit.  Of course, I encouraged him to contact us sooner with questions or concerns.         JESSICA HANSEN PA-C             D: 2018   T: 2018   MT: PHILLIP      Name:     MALIKA CALDERA   MRN:      [id number]        Account:      [id number]   :      1950           Service Date: 2018      Document: [id number]

## 2018-05-24 NOTE — LETTER
5/24/2018    Alka Thurston MD  [address]  [facility] [address]    RE: Tevin Hollis       Dear Colleague,    I had the pleasure of seeing Tevin Hollis in the [facility].    Please see separate dictation for HPI, PHYSICAL EXAM AND IMPRESSION/PLAN.    CURRENT MEDICATIONS:  Current Outpatient Prescriptions   Medication Sig Dispense Refill     carvedilol (COREG) 25 MG tablet Take 1 tablet (25 mg) by mouth 2 times daily (with meals) 180 tablet 3     chlorthalidone (HYGROTON) 25 MG tablet Take 2 tablets (50 mg) by mouth daily 180 tablet 3     lisinopril (PRINIVIL/ZESTRIL) 20 MG tablet Take 1 tablet (20 mg) by mouth 2 times daily 180 tablet 3     Multiple Vitamins-Minerals (MULTIVITAMIN PO) Take by mouth daily       ORDER FOR DME Equipment being ordered: CPAP supplies 1 Device 0     rivaroxaban ANTICOAGULANT (XARELTO) 20 MG TABS tablet Take 1 tablet (20 mg) by mouth daily (with dinner) 90 tablet 3       ALLERGIES:   No Known Allergies    PAST MEDICAL HISTORY:  Past Medical History:   Diagnosis Date     Benign essential hypertension      Class 2 obesity due to excess calories without serious comorbidity with body mass index (BMI) of 38.0 to 38.9 in adult 12/5/2014     Obstructive sleep apnea syndrome      Permanent atrial fibrillation (H) 11/14/2014    rate control     RBBB (right bundle branch block)        PAST SURGICAL HISTORY:  History reviewed. No pertinent surgical history.    SOCIAL HISTORY:  Social History     Social History     Marital status:      Spouse name: N/A     Number of children: N/A     Years of education: N/A     Social History Main Topics     Smoking status: Former Smoker     Packs/day: 0.50     Years: 2.00     Types: Cigarettes     Quit date: 1/1/1971     Smokeless tobacco: Never Used     Alcohol use 1.8 oz/week     3 Standard drinks or equivalent per week      Comment: 1 beer per day     Drug use: No     Sexual activity: Yes     Partners: Female      Other Topics Concern     Caffeine Concern No     none     Sleep Concern Yes     sleep apnea, uses cpap     Stress Concern Yes     health concerns with wife     Weight Concern Yes     wants to loose     Special Diet No     Exercise Yes     walking daily     Social History Narrative       FAMILY HISTORY:  Family History   Problem Relation Age of Onset     Respiratory Father      COPD     Hypertension Mother        Review of Systems:  Skin:  Negative       Eyes:  Positive for glasses readers  ENT:  Negative      Respiratory:  Positive for sleep apnea;CPAP     Cardiovascular:  Negative      Gastroenterology: Negative      Genitourinary:  Negative      Musculoskeletal:  Negative      Neurologic:  Negative      Psychiatric:  Negative      Heme/Lymph/Imm:  Negative      Endocrine:  Negative         Reviewed. Remainder of the note dictated.    Katina Martinez PA-C        Service Date: 05/24/2018      HISTORY OF PRESENT ILLNESS:  Mr. Hollis is a pleasant 67-year-old gentleman who presents to the Cardiology office today for followup on his hypertension.      His cardiovascular history includes chronic atrial fibrillation for which he remains on Xarelto for anticoagulation, hypertension, obstructive sleep apnea for which he religiously wears his CPAP, and obesity.  In late 2017 or early 2018, he was having some trouble with elevated blood pressures.  We up-titrated his carvedilol and started him on chlorthalidone and his blood pressure has been quite well controlled since then.      Overall, he is doing well from a cardiac standpoint.  He denies any cardiovascular symptoms or limitations such as chest discomfort, dyspnea on exertion, palpitations, orthopnea, PND or lower extremity edema.  He denies any medication side effects.  He really does not have any cardiac concerns or questions at this time.      CURRENT CARDIAC MEDICATIONS:   1.  Carvedilol 25 mg b.i.d.   2.  Chlorthalidone 25 mg 2 tablets daily.   3.   Lisinopril 20 mg b.i.d.   4.  Xarelto 20 mg once daily.      The remainder of his medications, allergies and review of systems were reviewed and as are documented separately.      PHYSICAL EXAMINATION:   GENERAL:  The patient is a 67-year-old gentleman who appears his stated age.  He is in no apparent distress.   VITAL SIGNS:  His blood pressure was 128/80, pulse 64, weight is 268 pounds, which is stable since his last office visit.   LUNGS:  Clear to auscultation and his breathing is nonlabored.   CARDIAC:  Reveals an irregular rhythm with well controlled rate.  No murmur is appreciated.  No lower extremity edema is appreciated.   NEUROLOGIC:  Alert and oriented.      LABORATORY STUDIES:  He had a basic metabolic panel today.  His sodium is 140, potassium is 3.7, BUN is 31 and creatinine is 0.97.      ASSESSMENT AND PLAN:  Castro is a 67-year-old gentleman with chronic atrial fibrillation and hypertension who presents for a blood pressure followup.  Overall, his blood pressure looks quite well controlled.  We will have to keep a close eye on his basic metabolic panel with the use of chlorthalidone and lisinopril.  For the time being, I recommended continuing his current medication regimen as is.  He does occasionally stop in to his local [place] office to have his blood pressure checked and I encouraged him to continue to do this.  We also discussed trying to increase physical activity and watch portion size in order to attempt weight loss.      He will follow up as previously planned with Dr. Kingston in November for his annual visit.  Of course, I encouraged him to contact us sooner with questions or concerns.         JESSIAC HANSEN PA-C             D: 2018   T: 2018   MT: PHILLIP      Name:     MALIKA CALDERA   MRN:      [id number]        Account:      [id number]   :      1950           Service Date: 2018      Document: [id number]       Thank you for allowing me to participate in  the care of your patient.      Sincerely,     Katina Martinez PA-C     [facility]    cc:   DARYA Peralta  [facility]  [address] [place] [id number]

## 2018-05-24 NOTE — PATIENT INSTRUCTIONS
Thank you for your [facility] visit today. Your provider has recommended the following:  Medication Changes:  No changes today  Recommendations:  Work on increasing your activity and watching portion size  Follow-up:  See Dr Kingston for cardiology follow up in Nov as planned.    Reminder:  Please bring in your current medication list or your medication, over the counter supplements and vitamin bottles as we will review these at each office visit.

## 2018-05-24 NOTE — LETTER
5/24/2018      Alka Thurstno MD  [address] [facility] [address]      RE: Tevin LOCKE Bunny       Dear Colleague,    I had the pleasure of seeing Tevin Hollis in the [facility].    Service Date: 05/24/2018      HISTORY OF PRESENT ILLNESS:  Mr. Hollis is a pleasant 67-year-old gentleman who presents to the Cardiology office today for followup on his hypertension.      His cardiovascular history includes chronic atrial fibrillation for which he remains on Xarelto for anticoagulation, hypertension, obstructive sleep apnea for which he religiously wears his CPAP, and obesity.  In late 2017 or early 2018, he was having some trouble with elevated blood pressures.  We up-titrated his carvedilol and started him on chlorthalidone and his blood pressure has been quite well controlled since then.      Overall, he is doing well from a cardiac standpoint.  He denies any cardiovascular symptoms or limitations such as chest discomfort, dyspnea on exertion, palpitations, orthopnea, PND or lower extremity edema.  He denies any medication side effects.  He really does not have any cardiac concerns or questions at this time.      CURRENT CARDIAC MEDICATIONS:   1.  Carvedilol 25 mg b.i.d.   2.  Chlorthalidone 25 mg 2 tablets daily.   3.  Lisinopril 20 mg b.i.d.   4.  Xarelto 20 mg once daily.      The remainder of his medications, allergies and review of systems were reviewed and as are documented separately.      PHYSICAL EXAMINATION:   GENERAL:  The patient is a 67-year-old gentleman who appears his stated age.  He is in no apparent distress.   VITAL SIGNS:  His blood pressure was 128/80, pulse 64, weight is 268 pounds, which is stable since his last office visit.   LUNGS:  Clear to auscultation and his breathing is nonlabored.   CARDIAC:  Reveals an irregular rhythm with well controlled rate.  No murmur is appreciated.  No lower extremity edema is appreciated.   NEUROLOGIC:  Alert and  oriented.      LABORATORY STUDIES:  He had a basic metabolic panel today.  His sodium is 140, potassium is 3.7, BUN is 31 and creatinine is 0.97.      ASSESSMENT AND PLAN:  Castro is a 67-year-old gentleman with chronic atrial fibrillation and hypertension who presents for a blood pressure followup.  Overall, his blood pressure looks quite well controlled.  We will have to keep a close eye on his basic metabolic panel with the use of chlorthalidone and lisinopril.  For the time being, I recommended continuing his current medication regimen as is.  He does occasionally stop in to his local [place] office to have his blood pressure checked and I encouraged him to continue to do this.  We also discussed trying to increase physical activity and watch portion size in order to attempt weight loss.      He will follow up as previously planned with Dr. Kingston in November for his annual visit.  Of course, I encouraged him to contact us sooner with questions or concerns.         JESSICA HANSEN PA-C             D: 2018   T: 2018   MT: PHILLIP      Name:     MALIKA CALDERA   MRN:      [id number]        Account:      [id number]   :      1950           Service Date: 2018      Document: [id number]         Outpatient Encounter Prescriptions as of 2018   Medication Sig Dispense Refill     carvedilol (COREG) 25 MG tablet Take 1 tablet (25 mg) by mouth 2 times daily (with meals) 180 tablet 3     chlorthalidone (HYGROTON) 25 MG tablet Take 2 tablets (50 mg) by mouth daily 180 tablet 3     lisinopril (PRINIVIL/ZESTRIL) 20 MG tablet Take 1 tablet (20 mg) by mouth 2 times daily 180 tablet 3     Multiple Vitamins-Minerals (MULTIVITAMIN PO) Take by mouth daily       ORDER FOR DME Equipment being ordered: CPAP supplies 1 Device 0     rivaroxaban ANTICOAGULANT (XARELTO) 20 MG TABS tablet Take 1 tablet (20 mg) by mouth daily (with dinner) 90 tablet 3     No facility-administered encounter medications on  file as of 5/24/2018.        Again, thank you for allowing me to participate in the care of your patient.      Sincerely,    Katina Martinez PA-C     [facility]

## 2018-11-27 ENCOUNTER — DOCUMENTATION ONLY (OUTPATIENT)
Dept: CARDIOLOGY | Facility: CLINIC | Age: 68
End: 2018-11-27

## 2018-11-27 NOTE — LETTER
November 27, 2018       TO: Tevin Hollis  [address]  [facility] [address]       Dear Tevin Hollis,    We are reviewing our outstanding orders.    Dr. Kingston has ordered an office visit and lab work for your annual follow up. Your last visit was in May, 2018. Dr. Kingston has some openings in February.     Please contact the scheduling desk at [phone] to arrange for an appointment.     If you have any questions, please call the Team 2 nurse phone @ [phone]. If you had your lab work done at another facility outside of the [facility] system, please give us a call so we can locate the results.     Thank you  Team 2 nurses

## 2019-03-08 ENCOUNTER — TELEPHONE (OUTPATIENT)
Dept: CARDIOLOGY | Facility: CLINIC | Age: 69
End: 2019-03-08

## 2019-03-08 DIAGNOSIS — I48.21 PERMANENT ATRIAL FIBRILLATION (H): Primary | ICD-10-CM

## 2019-03-08 NOTE — LETTER
March 8, 2019       TO: Tevin Hollis  [address]  [facility] [address]       Dear Tevin Hollis,    We are reviewing our outstanding orders.    Dr. Kingston has ordered an office visit and lab work for your annual follow up. Your last visit was in May, 2018. Dr. Kingston has some openings in May.     Please contact the scheduling desk at [phone] to arrange for an appointment.     If you have any questions, please call the Team 2 nurse phone @ [phone]. If you had your lab work done at another facility outside of the [facility] system, please give us a call so we can locate the results.     Thank you  Team 2 nurses

## 2019-03-08 NOTE — TELEPHONE ENCOUNTER
Left message for patient that Dr. Kingston has an opening in his schedule at the [place] office on 5/30/19.  Mailed reminder letter for overdue OV and labs

## 2019-05-29 DIAGNOSIS — I48.21 PERMANENT ATRIAL FIBRILLATION (H): ICD-10-CM

## 2020-03-02 NOTE — TELEPHONE ENCOUNTER
Panel Management Review      Patient has the following on his problem list:     Hypertension   Last three blood pressure readings:  BP Readings from Last 3 Encounters:   09/01/17 160/82   09/28/16 122/78   09/08/16 158/86     Blood pressure: MONITOR    HTN Guidelines:  Age 18-59 BP range:  Less than 140/90  Age 60-85 with Diabetes:  Less than 140/90  Age 60-85 without Diabetes:  less than 150/90        Composite cancer screening  Chart review shows that this patient is due/due soon for the following None  Summary:    Patient is due/failing the following:   BP CHECK    Action needed:   Patient needs office visit for bp f/u.    Type of outreach:    none, pt has appt with cardiology 11/2017    Questions for provider review:    None                                                                                                                                    Paulina Cormier CMA       Chart routed to no one .         DISPLAY PLAN FREE TEXT